# Patient Record
Sex: FEMALE | Race: WHITE | NOT HISPANIC OR LATINO | Employment: FULL TIME | URBAN - METROPOLITAN AREA
[De-identification: names, ages, dates, MRNs, and addresses within clinical notes are randomized per-mention and may not be internally consistent; named-entity substitution may affect disease eponyms.]

---

## 2019-12-24 ENCOUNTER — OFFICE VISIT (OUTPATIENT)
Dept: URGENT CARE | Facility: CLINIC | Age: 54
End: 2019-12-24
Payer: COMMERCIAL

## 2019-12-24 VITALS
SYSTOLIC BLOOD PRESSURE: 110 MMHG | OXYGEN SATURATION: 98 % | WEIGHT: 157.4 LBS | TEMPERATURE: 98.6 F | DIASTOLIC BLOOD PRESSURE: 68 MMHG | BODY MASS INDEX: 26.23 KG/M2 | HEART RATE: 72 BPM | HEIGHT: 65 IN | RESPIRATION RATE: 18 BRPM

## 2019-12-24 DIAGNOSIS — B34.9 VIRAL SYNDROME: Primary | ICD-10-CM

## 2019-12-24 LAB — S PYO AG THROAT QL: NEGATIVE

## 2019-12-24 PROCEDURE — 87880 STREP A ASSAY W/OPTIC: CPT | Performed by: PHYSICIAN ASSISTANT

## 2019-12-24 PROCEDURE — 99213 OFFICE O/P EST LOW 20 MIN: CPT | Performed by: PHYSICIAN ASSISTANT

## 2019-12-24 PROCEDURE — 87631 RESP VIRUS 3-5 TARGETS: CPT | Performed by: PHYSICIAN ASSISTANT

## 2019-12-24 RX ORDER — FLUOXETINE 10 MG/1
10 CAPSULE ORAL DAILY
COMMUNITY
End: 2020-10-07 | Stop reason: SDUPTHER

## 2019-12-24 RX ORDER — OMEPRAZOLE 20 MG/1
20 CAPSULE, DELAYED RELEASE ORAL DAILY
COMMUNITY
End: 2021-12-01

## 2019-12-24 NOTE — PROGRESS NOTES
[facility] [employer] [facility]        NAME: Julio Corral is a 48 y o  female  : 1965    MRN: [id number]  DATE: 2019  TIME: 4:34 PM    Assessment and Plan   Viral syndrome [B34 9]  1  Viral syndrome  Influenza A/B and RSV by PCR    POCT rapid strepA       Patient Instructions     Begin Mucinex for nasal congestion  Nasal saline spray or Neti pot to rinse the nasal passages  Tylenol or Advil may be taken for fever and discomfort  Check your package labeling as some cold medications already contain fever reducers  Saltwater gargles, warm tea with honey, and throat lozenges may be relieving of throat discomfort  Get plenty of rest and lots of water  Use a cool mist humidifier at bedtime, turning on hours prior to bed with your bedroom doors shut for maximum relief  Follow up with your family doctor in 3-5 days  Proceed to the ER if symptoms worsen  Discussed Flu vs alternative virus, at this time believed to be another virus given no fever or tachycardia  Pt requesting flu swab to be safe  Declined Tamiflu  The dx, expected course of sx, and tx plan reviewed  All questions answered  Precautions given  Chief Complaint     Chief Complaint   Patient presents with    Cold Like Symptoms     Started yesterday with HA, body aches, sl  sore throat, occ  dry cough, nasal congestion with PND, occ  chills and b/l ear pain/pressure  Took Tylenol last night   Earache    Cough         History of Present Illness       49 y/o female with c/o body aches x 1 day  She reports fatigue, body aches, HA, F/C/S, NC, RN, b/l ear pain, and sore throat  Reports a cough and nausea in response to PND  No treatments tried  Sick coworkers  No recent travel  No flu shot  Review of Systems   Review of Systems   Constitutional: Positive for chills, diaphoresis, fatigue and fever  HENT: Positive for congestion, ear pain, postnasal drip and sore throat  Negative for rhinorrhea  Respiratory: Positive for cough  What Type Of Note Output Would You Prefer (Optional)?: Standard Output Hpi Title: Evaluation of Skin Lesions Negative for wheezing  Gastrointestinal: Positive for nausea  Negative for abdominal pain, diarrhea and vomiting  Musculoskeletal: Positive for myalgias  Skin: Negative for rash  Neurological: Positive for headaches  Current Medications       Current Outpatient Medications:     FLUoxetine (PROzac) 10 mg capsule, Take 10 mg by mouth daily, Disp: , Rfl:     omeprazole (PriLOSEC) 20 mg delayed release capsule, Take 20 mg by mouth daily, Disp: , Rfl:     Current Allergies     Allergies as of 12/24/2019 - Reviewed 12/24/2019   Allergen Reaction Noted    Penicillins Facial Swelling 12/24/2019          The following portions of the patient's history were reviewed and updated as appropriate: allergies, current medications, past family history, past medical history, past social history, past surgical history and problem list      Past Medical History:   Diagnosis Date    Anxiety     Depression     GERD (gastroesophageal reflux disease)      Past Surgical History:   Procedure Laterality Date    CERVICAL FUSION      HERNIA REPAIR Left     inquinal    OVARY SURGERY Right     oopherectomy        History reviewed  No pertinent family history  Medications have been verified  Objective   /68   Pulse 72   Temp 98 6 °F (37 °C)   Resp 18   Ht 5' 5" (1 651 m)   Wt 71 4 kg (157 lb 6 4 oz)   SpO2 98%   BMI 26 19 kg/m²        Physical Exam     Physical Exam   Constitutional: Vital signs are normal  She appears well-developed and well-nourished  She is cooperative  She does not appear ill  No distress  HENT:   Head: Normocephalic and atraumatic  Right Ear: Hearing, tympanic membrane, external ear and ear canal normal    Left Ear: Hearing, tympanic membrane, external ear and ear canal normal    Nose: Nose normal    Mouth/Throat: Uvula is midline, oropharynx is clear and moist and mucous membranes are normal  Mucous membranes are not pale, not dry and not cyanotic  No oral lesions   No uvula How Severe Are Your Spot(S)?: moderate swelling  No oropharyngeal exudate, posterior oropharyngeal edema, posterior oropharyngeal erythema or tonsillar abscesses  Tonsils are 1+ on the right  Tonsils are 1+ on the left  No tonsillar exudate  Eyes: Conjunctivae and lids are normal  Right eye exhibits no discharge and no exudate  Left eye exhibits no discharge and no exudate  Neck: Trachea normal and phonation normal  Neck supple  No tracheal tenderness present  No neck rigidity  No edema and no erythema present  Cardiovascular: Normal rate, regular rhythm and normal heart sounds  Exam reveals no distant heart sounds  Pulmonary/Chest: Effort normal and breath sounds normal  No stridor  No respiratory distress  She has no decreased breath sounds  She has no wheezes  She has no rhonchi  She has no rales  Lymphadenopathy:     She has no cervical adenopathy  Neurological: She is alert  She is not disoriented  No cranial nerve deficit  Coordination and gait normal    Skin: Skin is warm, dry and intact  No rash noted  She is not diaphoretic  No erythema  No pallor  Psychiatric: She has a normal mood and affect  Her behavior is normal  Judgment and thought content normal    Nursing note and vitals reviewed  Have Your Spot(S) Been Treated In The Past?: has not been treated Additional History: PCP Dr Dennis Mojica

## 2019-12-24 NOTE — PATIENT INSTRUCTIONS
Begin Mucinex for nasal congestion  Nasal saline spray or Neti pot to rinse the nasal passages  Tylenol or Advil may be taken for fever and discomfort  Check your package labeling as some cold medications already contain fever reducers  Saltwater gargles, warm tea with honey, and throat lozenges may be relieving of throat discomfort  Get plenty of rest and lots of water  Use a cool mist humidifier at bedtime, turning on hours prior to bed with your bedroom doors shut for maximum relief  Follow up with your family doctor in 3-5 days  Proceed to the ER if symptoms worsen

## 2019-12-25 LAB
FLUAV RNA NPH QL NAA+PROBE: NORMAL
FLUBV RNA NPH QL NAA+PROBE: NORMAL
RSV RNA NPH QL NAA+PROBE: NORMAL

## 2020-03-13 ENCOUNTER — TELEPHONE (OUTPATIENT)
Dept: FAMILY MEDICINE CLINIC | Facility: CLINIC | Age: 55
End: 2020-03-13

## 2020-10-07 ENCOUNTER — OFFICE VISIT (OUTPATIENT)
Dept: FAMILY MEDICINE CLINIC | Facility: CLINIC | Age: 55
End: 2020-10-07
Payer: COMMERCIAL

## 2020-10-07 VITALS
DIASTOLIC BLOOD PRESSURE: 80 MMHG | WEIGHT: 162 LBS | SYSTOLIC BLOOD PRESSURE: 122 MMHG | BODY MASS INDEX: 26.99 KG/M2 | HEIGHT: 65 IN | OXYGEN SATURATION: 98 % | RESPIRATION RATE: 18 BRPM | HEART RATE: 68 BPM | TEMPERATURE: 96.8 F

## 2020-10-07 DIAGNOSIS — Z13.6 SCREENING FOR CARDIOVASCULAR CONDITION: ICD-10-CM

## 2020-10-07 DIAGNOSIS — T78.2XXA ANAPHYLAXIS, INITIAL ENCOUNTER: ICD-10-CM

## 2020-10-07 DIAGNOSIS — F41.9 ANXIETY: ICD-10-CM

## 2020-10-07 DIAGNOSIS — Z00.00 WELL ADULT EXAM: Primary | ICD-10-CM

## 2020-10-07 DIAGNOSIS — R73.09 ABNORMAL GLUCOSE: ICD-10-CM

## 2020-10-07 DIAGNOSIS — K21.9 GASTROESOPHAGEAL REFLUX DISEASE WITHOUT ESOPHAGITIS: ICD-10-CM

## 2020-10-07 DIAGNOSIS — Z11.4 SCREENING FOR HIV (HUMAN IMMUNODEFICIENCY VIRUS): ICD-10-CM

## 2020-10-07 DIAGNOSIS — E55.9 VITAMIN D DEFICIENCY: ICD-10-CM

## 2020-10-07 DIAGNOSIS — N30.10 INTERSTITIAL CYSTITIS: ICD-10-CM

## 2020-10-07 DIAGNOSIS — Z12.31 SCREENING MAMMOGRAM FOR HIGH-RISK PATIENT: ICD-10-CM

## 2020-10-07 PROBLEM — R91.1 SOLITARY PULMONARY NODULE: Status: ACTIVE | Noted: 2019-02-08

## 2020-10-07 PROBLEM — Z72.0 TOBACCO ABUSE DISORDER: Status: ACTIVE | Noted: 2020-10-07

## 2020-10-07 PROBLEM — R56.9 SEIZURE (HCC): Status: ACTIVE | Noted: 2019-02-08

## 2020-10-07 PROBLEM — G43.909 MIGRAINE: Status: ACTIVE | Noted: 2019-02-08

## 2020-10-07 PROCEDURE — 99386 PREV VISIT NEW AGE 40-64: CPT | Performed by: FAMILY MEDICINE

## 2020-10-07 PROCEDURE — 3725F SCREEN DEPRESSION PERFORMED: CPT | Performed by: FAMILY MEDICINE

## 2020-10-07 RX ORDER — AZELASTINE 1 MG/ML
1 SPRAY, METERED NASAL DAILY
COMMUNITY
End: 2021-12-01

## 2020-10-07 RX ORDER — EPINEPHRINE 0.3 MG/.3ML
0.3 INJECTION SUBCUTANEOUS ONCE
Qty: 1 EACH | Refills: 1
Start: 2020-10-07 | End: 2021-06-03 | Stop reason: SDUPTHER

## 2020-10-07 RX ORDER — FLUOXETINE 10 MG/1
10 CAPSULE ORAL DAILY
Qty: 90 CAPSULE | Refills: 1 | Status: SHIPPED | OUTPATIENT
Start: 2020-10-07 | End: 2021-06-19

## 2020-10-08 ENCOUNTER — TELEPHONE (OUTPATIENT)
Dept: FAMILY MEDICINE CLINIC | Facility: CLINIC | Age: 55
End: 2020-10-08

## 2020-10-08 ENCOUNTER — TELEPHONE (OUTPATIENT)
Dept: ADMINISTRATIVE | Facility: OTHER | Age: 55
End: 2020-10-08

## 2020-10-08 DIAGNOSIS — Z20.822 EXPOSURE TO COVID-19 VIRUS: Primary | ICD-10-CM

## 2020-10-28 ENCOUNTER — TELEPHONE (OUTPATIENT)
Dept: FAMILY MEDICINE CLINIC | Facility: CLINIC | Age: 55
End: 2020-10-28

## 2020-11-04 ENCOUNTER — TELEPHONE (OUTPATIENT)
Dept: FAMILY MEDICINE CLINIC | Facility: CLINIC | Age: 55
End: 2020-11-04

## 2020-11-21 LAB
25(OH)D3+25(OH)D2 SERPL-MCNC: 20.1 NG/ML (ref 30–100)
ALBUMIN SERPL-MCNC: 4.6 G/DL (ref 3.8–4.9)
ALBUMIN/GLOB SERPL: 1.9 {RATIO} (ref 1.2–2.2)
ALP SERPL-CCNC: 63 IU/L (ref 39–117)
ALT SERPL-CCNC: 16 IU/L (ref 0–32)
AST SERPL-CCNC: 21 IU/L (ref 0–40)
BILIRUB SERPL-MCNC: 0.3 MG/DL (ref 0–1.2)
BUN SERPL-MCNC: 6 MG/DL (ref 6–24)
BUN/CREAT SERPL: 7 (ref 9–23)
CALCIUM SERPL-MCNC: 10 MG/DL (ref 8.7–10.2)
CHLORIDE SERPL-SCNC: 100 MMOL/L (ref 96–106)
CHOLEST SERPL-MCNC: 245 MG/DL (ref 100–199)
CO2 SERPL-SCNC: 24 MMOL/L (ref 20–29)
CREAT SERPL-MCNC: 0.89 MG/DL (ref 0.57–1)
GLOBULIN SER-MCNC: 2.4 G/DL (ref 1.5–4.5)
GLUCOSE SERPL-MCNC: 106 MG/DL (ref 65–99)
HBA1C MFR BLD: 5.5 % (ref 4.8–5.6)
HDLC SERPL-MCNC: 58 MG/DL
HIV 1+2 AB+HIV1 P24 AG SERPL QL IA: NON REACTIVE
LDLC SERPL CALC-MCNC: 179 MG/DL (ref 0–99)
MICRODELETION SYND BLD/T FISH: NORMAL
POTASSIUM SERPL-SCNC: 5.1 MMOL/L (ref 3.5–5.2)
PROT SERPL-MCNC: 7 G/DL (ref 6–8.5)
SARS-COV-2 IGG SERPL QL IA: NEGATIVE
SARS-COV-2 IGG SERPL QL IA: NORMAL
SARS-COV-2 IGM SERPL QL IA: NEGATIVE
SL AMB EGFR AFRICAN AMERICAN: 86 ML/MIN/1.73
SL AMB EGFR NON AFRICAN AMERICAN: 74 ML/MIN/1.73
SODIUM SERPL-SCNC: 136 MMOL/L (ref 134–144)
TRIGL SERPL-MCNC: 51 MG/DL (ref 0–149)

## 2020-11-23 ENCOUNTER — TELEMEDICINE (OUTPATIENT)
Dept: FAMILY MEDICINE CLINIC | Facility: CLINIC | Age: 55
End: 2020-11-23
Payer: COMMERCIAL

## 2020-11-23 ENCOUNTER — TELEPHONE (OUTPATIENT)
Dept: FAMILY MEDICINE CLINIC | Facility: CLINIC | Age: 55
End: 2020-11-23

## 2020-11-23 DIAGNOSIS — Z20.822 EXPOSURE TO COVID-19 VIRUS: ICD-10-CM

## 2020-11-23 DIAGNOSIS — J02.9 PHARYNGITIS, UNSPECIFIED ETIOLOGY: Primary | ICD-10-CM

## 2020-11-23 DIAGNOSIS — Z20.822 EXPOSURE TO COVID-19 VIRUS: Primary | ICD-10-CM

## 2020-11-23 PROCEDURE — 99442 PR PHYS/QHP TELEPHONE EVALUATION 11-20 MIN: CPT | Performed by: FAMILY MEDICINE

## 2020-11-23 PROCEDURE — U0003 INFECTIOUS AGENT DETECTION BY NUCLEIC ACID (DNA OR RNA); SEVERE ACUTE RESPIRATORY SYNDROME CORONAVIRUS 2 (SARS-COV-2) (CORONAVIRUS DISEASE [COVID-19]), AMPLIFIED PROBE TECHNIQUE, MAKING USE OF HIGH THROUGHPUT TECHNOLOGIES AS DESCRIBED BY CMS-2020-01-R: HCPCS | Performed by: FAMILY MEDICINE

## 2020-11-23 RX ORDER — AZITHROMYCIN 250 MG/1
TABLET, FILM COATED ORAL
Qty: 6 TABLET | Refills: 0 | Status: SHIPPED | OUTPATIENT
Start: 2020-11-23 | End: 2020-11-28

## 2020-11-24 ENCOUNTER — TELEPHONE (OUTPATIENT)
Dept: FAMILY MEDICINE CLINIC | Facility: CLINIC | Age: 55
End: 2020-11-24

## 2020-11-24 LAB — SARS-COV-2 RNA SPEC QL NAA+PROBE: NOT DETECTED

## 2020-11-25 ENCOUNTER — TELEPHONE (OUTPATIENT)
Dept: FAMILY MEDICINE CLINIC | Facility: CLINIC | Age: 55
End: 2020-11-25

## 2020-11-25 ENCOUNTER — TELEMEDICINE (OUTPATIENT)
Dept: FAMILY MEDICINE CLINIC | Facility: CLINIC | Age: 55
End: 2020-11-25
Payer: COMMERCIAL

## 2020-11-25 VITALS — TEMPERATURE: 98.2 F | HEIGHT: 65 IN | WEIGHT: 162 LBS | BODY MASS INDEX: 26.99 KG/M2

## 2020-11-25 DIAGNOSIS — E55.9 VITAMIN D DEFICIENCY: Primary | ICD-10-CM

## 2020-11-25 DIAGNOSIS — E78.2 MIXED HYPERLIPIDEMIA: ICD-10-CM

## 2020-11-25 PROCEDURE — 3008F BODY MASS INDEX DOCD: CPT | Performed by: FAMILY MEDICINE

## 2020-11-25 PROCEDURE — 99213 OFFICE O/P EST LOW 20 MIN: CPT | Performed by: FAMILY MEDICINE

## 2020-11-25 RX ORDER — ROSUVASTATIN CALCIUM 10 MG/1
10 TABLET, COATED ORAL DAILY
Qty: 90 TABLET | Refills: 1 | Status: SHIPPED | OUTPATIENT
Start: 2020-11-25 | End: 2021-08-14

## 2020-11-25 RX ORDER — ERGOCALCIFEROL (VITAMIN D2) 1250 MCG
50000 CAPSULE ORAL WEEKLY
Qty: 8 CAPSULE | Refills: 0 | Status: SHIPPED | OUTPATIENT
Start: 2020-11-25 | End: 2021-03-16

## 2021-03-16 DIAGNOSIS — E55.9 VITAMIN D DEFICIENCY: ICD-10-CM

## 2021-03-16 RX ORDER — ERGOCALCIFEROL 1.25 MG/1
CAPSULE ORAL
Qty: 8 CAPSULE | Refills: 0 | Status: SHIPPED | OUTPATIENT
Start: 2021-03-16 | End: 2021-12-01

## 2021-04-05 ENCOUNTER — TELEPHONE (OUTPATIENT)
Dept: FAMILY MEDICINE CLINIC | Facility: CLINIC | Age: 56
End: 2021-04-05

## 2021-04-05 DIAGNOSIS — Z12.31 SCREENING MAMMOGRAM FOR HIGH-RISK PATIENT: ICD-10-CM

## 2021-06-03 ENCOUNTER — OFFICE VISIT (OUTPATIENT)
Dept: FAMILY MEDICINE CLINIC | Facility: CLINIC | Age: 56
End: 2021-06-03
Payer: COMMERCIAL

## 2021-06-03 VITALS
BODY MASS INDEX: 26.66 KG/M2 | WEIGHT: 160 LBS | OXYGEN SATURATION: 97 % | TEMPERATURE: 98 F | HEIGHT: 65 IN | RESPIRATION RATE: 16 BRPM | HEART RATE: 72 BPM | SYSTOLIC BLOOD PRESSURE: 124 MMHG | DIASTOLIC BLOOD PRESSURE: 76 MMHG

## 2021-06-03 DIAGNOSIS — R56.9 SEIZURE (HCC): ICD-10-CM

## 2021-06-03 DIAGNOSIS — M79.662 PAIN OF LEFT CALF: Primary | ICD-10-CM

## 2021-06-03 DIAGNOSIS — T78.2XXA ANAPHYLAXIS, INITIAL ENCOUNTER: ICD-10-CM

## 2021-06-03 DIAGNOSIS — S89.92XA TRAUMATIC LEG INJURY, LEFT, INITIAL ENCOUNTER: ICD-10-CM

## 2021-06-03 PROCEDURE — 3725F SCREEN DEPRESSION PERFORMED: CPT | Performed by: FAMILY MEDICINE

## 2021-06-03 PROCEDURE — 99213 OFFICE O/P EST LOW 20 MIN: CPT | Performed by: FAMILY MEDICINE

## 2021-06-03 PROCEDURE — 3008F BODY MASS INDEX DOCD: CPT | Performed by: FAMILY MEDICINE

## 2021-06-03 RX ORDER — AZELASTINE HYDROCHLORIDE, FLUTICASONE PROPIONATE 137; 50 UG/1; UG/1
SPRAY, METERED NASAL
COMMUNITY
Start: 2021-03-16 | End: 2021-12-01

## 2021-06-03 RX ORDER — EPINEPHRINE 0.3 MG/.3ML
0.3 INJECTION SUBCUTANEOUS ONCE
Qty: 1 EACH | Refills: 1
Start: 2021-06-03 | End: 2021-06-21 | Stop reason: SDUPTHER

## 2021-06-03 NOTE — PROGRESS NOTES
Assessment/Plan:    1  Pain of left calf  -     Magnesium; Future  -     Phosphorus; Future  -     Comprehensive metabolic panel; Future; Expected date: 08/17/2021  -     CK (with reflex to MB); Future  -     MRI tibia fibula left wo contrast; Future; Expected date: 06/03/2021    2  Traumatic leg injury, left, initial encounter  -     Magnesium; Future  -     Phosphorus; Future  -     Comprehensive metabolic panel; Future; Expected date: 08/17/2021  -     CK (with reflex to MB); Future  -     MRI tibia fibula left wo contrast; Future; Expected date: 06/03/2021    3  Seizure ([address] )    4  Anaphylaxis, initial encounter  -     EPINEPHrine (EPIPEN) 0 3 mg/0 3 mL SOAJ; Inject 0 3 mL (0 3 mg total) into a muscle once for 1 dose As needed for anaphylaxis, proceed to ER    Pt seems to have a tear at gastroch on left leg - will follow MRI  Pt advised of my feelings will follow        There are no Patient Instructions on file for this visit  Return for Next scheduled follow up  Subjective:      Patient ID: Chuck Vasquez is a 49 y o  female  Chief Complaint   Patient presents with    Pulled muscle     Left calf  mz cma       Pt is here today with a pull in her left calf  Pt states two months ago she was going down the stairs felt something rip, felt a tear  In her left calf  PT states it feels very deep  States she will now get the ripping feeling when he walks or takes a step  The following portions of the patient's history were reviewed and updated as appropriate: allergies, current medications, past family history, past medical history, past social history, past surgical history and problem list     Review of Systems   Constitutional: Negative  Negative for activity change, appetite change, chills, diaphoresis and fatigue  HENT: Negative  Negative for dental problem, ear pain, sinus pressure and sore throat  Eyes: Negative    Negative for photophobia, pain, discharge, redness, itching and visual disturbance  Respiratory: Negative for apnea and chest tightness  Cardiovascular: Negative  Negative for chest pain, palpitations and leg swelling  Gastrointestinal: Negative  Negative for abdominal distention, abdominal pain, constipation and diarrhea  Endocrine: Negative  Negative for cold intolerance and heat intolerance  Genitourinary: Negative  Negative for difficulty urinating and dyspareunia  Musculoskeletal: Positive for gait problem and myalgias  Negative for arthralgias and back pain  Skin: Negative  Allergic/Immunologic: Negative for environmental allergies  Neurological: Negative for dizziness  Psychiatric/Behavioral: Negative  Negative for agitation  Current Outpatient Medications   Medication Sig Dispense Refill    azelastine (ASTELIN) 0 1 % nasal spray 1 spray into each nostril daily Use in each nostril as directed      Azelastine-Fluticasone 137-50 MCG/ACT SUSP       EPINEPHrine (EPIPEN) 0 3 mg/0 3 mL SOAJ Inject 0 3 mL (0 3 mg total) into a muscle once for 1 dose As needed for anaphylaxis, proceed to ER 1 each 1    FLUoxetine (PROzac) 10 mg capsule Take 1 capsule (10 mg total) by mouth daily 90 capsule 1    omeprazole (PriLOSEC) 20 mg delayed release capsule Take 20 mg by mouth daily      rosuvastatin (CRESTOR) 10 MG tablet Take 1 tablet (10 mg total) by mouth daily 90 tablet 1    ergocalciferol (VITAMIN D2) 50,000 units TAKE ONE CAPSULE BY MOUTH ONCE EVERY WEEK FOR 8 DOSES (Patient not taking: Reported on 6/3/2021) 8 capsule 0     No current facility-administered medications for this visit  Objective:    /76   Pulse 72   Temp 98 °F (36 7 °C)   Resp 16   Ht 5' 5" (1 651 m)   Wt 72 6 kg (160 lb)   SpO2 97%   BMI 26 63 kg/m²        Physical Exam  Vitals signs and nursing note reviewed  Constitutional:       General: She is not in acute distress  Appearance: She is well-developed  She is not diaphoretic     HENT:      Head: Normocephalic and atraumatic  Right Ear: External ear normal       Left Ear: External ear normal       Nose: Nose normal       Mouth/Throat:      Pharynx: No oropharyngeal exudate  Eyes:      General: No scleral icterus  Right eye: No discharge  Left eye: No discharge  Pupils: Pupils are equal, round, and reactive to light  Neck:      Thyroid: No thyromegaly  Cardiovascular:      Rate and Rhythm: Normal rate  Heart sounds: Normal heart sounds  No murmur  Pulmonary:      Effort: Pulmonary effort is normal  No respiratory distress  Breath sounds: Normal breath sounds  No wheezing  Abdominal:      General: Bowel sounds are normal  There is no distension  Palpations: Abdomen is soft  There is no mass  Tenderness: There is no abdominal tenderness  There is no guarding or rebound  Musculoskeletal: Normal range of motion  Comments: Tenderness in left lat gastroch at distal end  Swelling present  Pain with stepping  Pain with plantar flexion of foot   Skin:     General: Skin is warm and dry  Findings: No erythema or rash  Neurological:      Mental Status: She is alert        Coordination: Coordination normal       Deep Tendon Reflexes: Reflexes normal    Psychiatric:         Behavior: Behavior normal                 Rafia Null DO

## 2021-06-13 LAB
ALBUMIN SERPL-MCNC: 4.7 G/DL (ref 3.8–4.9)
ALBUMIN/GLOB SERPL: 2.5 {RATIO} (ref 1.2–2.2)
ALP SERPL-CCNC: 65 IU/L (ref 48–121)
ALT SERPL-CCNC: 15 IU/L (ref 0–32)
AST SERPL-CCNC: 18 IU/L (ref 0–40)
BILIRUB SERPL-MCNC: 0.2 MG/DL (ref 0–1.2)
BUN SERPL-MCNC: 11 MG/DL (ref 6–24)
BUN/CREAT SERPL: 13 (ref 9–23)
CALCIUM SERPL-MCNC: 9.8 MG/DL (ref 8.7–10.2)
CHLORIDE SERPL-SCNC: 100 MMOL/L (ref 96–106)
CK SERPL-CCNC: 59 U/L (ref 32–182)
CO2 SERPL-SCNC: 25 MMOL/L (ref 20–29)
CREAT SERPL-MCNC: 0.87 MG/DL (ref 0.57–1)
GLOBULIN SER-MCNC: 1.9 G/DL (ref 1.5–4.5)
GLUCOSE SERPL-MCNC: 103 MG/DL (ref 65–99)
MAGNESIUM SERPL-MCNC: 2.1 MG/DL (ref 1.6–2.3)
PHOSPHATE SERPL-MCNC: 4.9 MG/DL (ref 3–4.3)
POTASSIUM SERPL-SCNC: 4.7 MMOL/L (ref 3.5–5.2)
PROT SERPL-MCNC: 6.6 G/DL (ref 6–8.5)
SL AMB EGFR AFRICAN AMERICAN: 87 ML/MIN/1.73
SL AMB EGFR NON AFRICAN AMERICAN: 76 ML/MIN/1.73
SODIUM SERPL-SCNC: 137 MMOL/L (ref 134–144)

## 2021-06-19 DIAGNOSIS — F41.9 ANXIETY: ICD-10-CM

## 2021-06-19 RX ORDER — FLUOXETINE 10 MG/1
CAPSULE ORAL
Qty: 90 CAPSULE | Refills: 1 | Status: SHIPPED | OUTPATIENT
Start: 2021-06-19 | End: 2022-04-18

## 2021-06-21 DIAGNOSIS — T78.2XXA ANAPHYLAXIS, INITIAL ENCOUNTER: ICD-10-CM

## 2021-06-21 RX ORDER — EPINEPHRINE 0.3 MG/.3ML
0.3 INJECTION SUBCUTANEOUS ONCE
Qty: 1 EACH | Refills: 1
Start: 2021-06-21 | End: 2021-07-13 | Stop reason: SDUPTHER

## 2021-07-13 DIAGNOSIS — T78.2XXA ANAPHYLAXIS, INITIAL ENCOUNTER: ICD-10-CM

## 2021-07-13 RX ORDER — EPINEPHRINE 0.3 MG/.3ML
0.3 INJECTION SUBCUTANEOUS ONCE
Qty: 1 EACH | Refills: 1
Start: 2021-07-13 | End: 2021-07-21 | Stop reason: SDUPTHER

## 2021-07-21 DIAGNOSIS — T78.2XXA ANAPHYLAXIS, INITIAL ENCOUNTER: ICD-10-CM

## 2021-07-21 RX ORDER — EPINEPHRINE 0.3 MG/.3ML
0.3 INJECTION SUBCUTANEOUS ONCE
Qty: 1 EACH | Refills: 1 | Status: SHIPPED | OUTPATIENT
Start: 2021-07-21 | End: 2021-07-23 | Stop reason: SDUPTHER

## 2021-08-04 ENCOUNTER — OFFICE VISIT (OUTPATIENT)
Dept: URGENT CARE | Facility: CLINIC | Age: 56
End: 2021-08-04
Payer: COMMERCIAL

## 2021-08-04 VITALS
DIASTOLIC BLOOD PRESSURE: 70 MMHG | TEMPERATURE: 100.2 F | HEIGHT: 65 IN | HEART RATE: 91 BPM | SYSTOLIC BLOOD PRESSURE: 117 MMHG | BODY MASS INDEX: 26.66 KG/M2 | RESPIRATION RATE: 18 BRPM | OXYGEN SATURATION: 97 % | WEIGHT: 160 LBS

## 2021-08-04 DIAGNOSIS — Z11.59 SPECIAL SCREENING EXAMINATION FOR UNSPECIFIED VIRAL DISEASE: Primary | ICD-10-CM

## 2021-08-04 PROCEDURE — 99213 OFFICE O/P EST LOW 20 MIN: CPT | Performed by: FAMILY MEDICINE

## 2021-08-04 PROCEDURE — U0003 INFECTIOUS AGENT DETECTION BY NUCLEIC ACID (DNA OR RNA); SEVERE ACUTE RESPIRATORY SYNDROME CORONAVIRUS 2 (SARS-COV-2) (CORONAVIRUS DISEASE [COVID-19]), AMPLIFIED PROBE TECHNIQUE, MAKING USE OF HIGH THROUGHPUT TECHNOLOGIES AS DESCRIBED BY CMS-2020-01-R: HCPCS | Performed by: FAMILY MEDICINE

## 2021-08-04 PROCEDURE — U0005 INFEC AGEN DETEC AMPLI PROBE: HCPCS | Performed by: FAMILY MEDICINE

## 2021-08-04 PROCEDURE — 3008F BODY MASS INDEX DOCD: CPT | Performed by: FAMILY MEDICINE

## 2021-08-04 NOTE — PROGRESS NOTES
[facility] [email or web address] [facility]        NAME: Umu Reddy is a 49 y o  female  : 1965    MRN: [id number]  DATE: 2021  TIME: 9:14 AM    Assessment and Plan   Special screening examination for unspecified viral disease [Z11 59]  1  Special screening examination for unspecified viral disease  Novel Coronavirus (Covid-19),PCR [facility]     Tested for COVID-19 and instructed to self quarantine until her results are received  Continue with supportive management including remaining well hydrated with water, taking Tylenol for pain and fevers and taking a multivitamin (with vitamin-C and D)  Patient Instructions     Follow up with PCP in 3-5 days  Proceed to  ER if symptoms worsen  Chief Complaint     Chief Complaint   Patient presents with    Fever     Patient stated that Monday she started with chills and head congestion  Her fever was running 102 to 103 tylenol would bring the fever down but as soon it wears of her fever comes back  She has traveled to [facility] when the symptoms started    Nasal Congestion         History of Present Illness     53-year-old female presents today with about 3 days of symptoms concerning for possible COVID-19  Has been experiencing fatigue, high fevers up to 103°, chills, headaches and generalized myalgias  Also reports no onset postnasal drip with some sinus pressure  Does not recall any obvious sick contacts  Did travel out of state for about 2 days and returned yesterday  Review of Systems   Review of Systems   Constitutional: Positive for chills, fatigue and fever (103)  HENT: Positive for postnasal drip and sinus pressure  Respiratory: Negative for cough  Musculoskeletal: Positive for myalgias  Neurological: Positive for headaches  Psychiatric/Behavioral: Positive for confusion       Current Medications       Current Outpatient Medications:     azelastine (ASTELIN) 0 1 % nasal spray, 1 spray into each nostril daily Use in each nostril as directed, Disp: , Rfl:     Azelastine-Fluticasone 137-50 MCG/ACT SUSP, , Disp: , Rfl:     FLUoxetine (PROzac) 10 mg capsule, TAKE ONE CAPSULE BY MOUTH EVERY DAY, Disp: 90 capsule, Rfl: 1    omeprazole (PriLOSEC) 20 mg delayed release capsule, Take 20 mg by mouth daily, Disp: , Rfl:     rosuvastatin (CRESTOR) 10 MG tablet, Take 1 tablet (10 mg total) by mouth daily, Disp: 90 tablet, Rfl: 1    EPINEPHrine (EPIPEN) 0 3 mg/0 3 mL SOAJ, Inject 0 3 mL (0 3 mg total) into a muscle once for 1 dose As needed for anaphylaxis, proceed to ER, Disp: 1 each, Rfl: 1    ergocalciferol (VITAMIN D2) 50,000 units, TAKE ONE CAPSULE BY MOUTH ONCE EVERY WEEK FOR 8 DOSES (Patient not taking: Reported on 6/3/2021), Disp: 8 capsule, Rfl: 0    Current Allergies     Allergies as of 08/04/2021 - Reviewed 08/04/2021   Allergen Reaction Noted    Penicillins Facial Swelling 12/24/2019    Aspirin Other (See Comments) 12/24/2019    Latex Rash 10/07/2020    Other Rash 10/07/2020            The following portions of the patient's history were reviewed and updated as appropriate: allergies, current medications, past family history, past medical history, past social history, past surgical history and problem list      Past Medical History:   Diagnosis Date    Anxiety     Chronic sinusitis     Depression     GERD (gastroesophageal reflux disease)        Past Surgical History:   Procedure Laterality Date    CERVICAL FUSION      HERNIA REPAIR Left     inquinal    OVARY SURGERY Right     oopherectomy     TOE SURGERY         Family History   Problem Relation Age of Onset    COPD Mother     No Known Problems Father     Breast cancer Maternal Grandmother     Breast cancer Cousin     Mental illness Neg Hx          Medications have been verified  Objective   /70   Pulse 91   Temp 100 2 °F (37 9 °C)   Resp 18   Ht 5' 5" (1 651 m)   Wt 72 6 kg (160 lb)   SpO2 97%   BMI 26 63 kg/m²   No LMP recorded   Patient is postmenopausal        Physical Exam     Physical Exam  Vitals and nursing note reviewed  Constitutional:       General: She is in acute distress  Appearance: Normal appearance  She is normal weight  She is not ill-appearing, toxic-appearing or diaphoretic  HENT:      Head: Normocephalic and atraumatic  Eyes:      General:         Right eye: No discharge  Left eye: No discharge  Conjunctiva/sclera: Conjunctivae normal    Pulmonary:      Effort: Pulmonary effort is normal    Skin:     General: Skin is warm  Findings: No erythema  Neurological:      General: No focal deficit present  Mental Status: She is alert and oriented to person, place, and time  Psychiatric:         Mood and Affect: Mood normal          Behavior: Behavior normal          Thought Content:  Thought content normal          Judgment: Judgment normal

## 2021-08-05 ENCOUNTER — TELEPHONE (OUTPATIENT)
Dept: URGENT CARE | Facility: CLINIC | Age: 56
End: 2021-08-05

## 2021-08-05 DIAGNOSIS — R50.81 FEVER IN OTHER DISEASES: ICD-10-CM

## 2021-08-05 DIAGNOSIS — B96.89 BACTERIAL URI: ICD-10-CM

## 2021-08-05 DIAGNOSIS — J06.9 BACTERIAL URI: ICD-10-CM

## 2021-08-05 LAB — SARS-COV-2 RNA RESP QL NAA+PROBE: NEGATIVE

## 2021-08-05 RX ORDER — AZITHROMYCIN 250 MG/1
TABLET, FILM COATED ORAL
Qty: 6 TABLET | Refills: 0 | Status: SHIPPED | OUTPATIENT
Start: 2021-08-05 | End: 2021-08-09

## 2021-08-05 NOTE — TELEPHONE ENCOUNTER
PT CALLED  COVID TEST NEGATIVE  SHE WOULD LIKE AN ANTIBIOTIC OR DECONGESTANT OR SOMETHING SENT TO PHARMACY   BUT WANTS TO SPEAK TO YOU FIRST

## 2021-08-14 DIAGNOSIS — E78.2 MIXED HYPERLIPIDEMIA: ICD-10-CM

## 2021-08-14 RX ORDER — ROSUVASTATIN CALCIUM 10 MG/1
TABLET, COATED ORAL
Qty: 90 TABLET | Refills: 1 | Status: SHIPPED | OUTPATIENT
Start: 2021-08-14 | End: 2021-12-01

## 2021-10-06 ENCOUNTER — TELEPHONE (OUTPATIENT)
Dept: FAMILY MEDICINE CLINIC | Facility: CLINIC | Age: 56
End: 2021-10-06

## 2021-10-22 ENCOUNTER — HOSPITAL ENCOUNTER (EMERGENCY)
Facility: HOSPITAL | Age: 56
Discharge: HOME | End: 2021-10-22
Attending: EMERGENCY MEDICINE
Payer: COMMERCIAL

## 2021-10-22 ENCOUNTER — OFFICE VISIT (OUTPATIENT)
Dept: FAMILY MEDICINE CLINIC | Facility: CLINIC | Age: 56
End: 2021-10-22
Payer: COMMERCIAL

## 2021-10-22 ENCOUNTER — APPOINTMENT (EMERGENCY)
Dept: RADIOLOGY | Facility: HOSPITAL | Age: 56
End: 2021-10-22
Payer: COMMERCIAL

## 2021-10-22 VITALS
SYSTOLIC BLOOD PRESSURE: 110 MMHG | RESPIRATION RATE: 16 BRPM | HEART RATE: 91 BPM | BODY MASS INDEX: 26.66 KG/M2 | DIASTOLIC BLOOD PRESSURE: 80 MMHG | TEMPERATURE: 97.5 F | HEIGHT: 65 IN | OXYGEN SATURATION: 98 % | WEIGHT: 160 LBS

## 2021-10-22 VITALS
RESPIRATION RATE: 16 BRPM | OXYGEN SATURATION: 97 % | SYSTOLIC BLOOD PRESSURE: 110 MMHG | TEMPERATURE: 96.7 F | DIASTOLIC BLOOD PRESSURE: 65 MMHG | HEART RATE: 80 BPM

## 2021-10-22 DIAGNOSIS — L03.213 PERIORBITAL CELLULITIS OF LEFT EYE: Primary | ICD-10-CM

## 2021-10-22 DIAGNOSIS — L03.213 PRESEPTAL CELLULITIS OF LEFT EYE: Primary | ICD-10-CM

## 2021-10-22 DIAGNOSIS — R60.0 PERIORBITAL EDEMA OF LEFT EYE: ICD-10-CM

## 2021-10-22 LAB
ANION GAP SERPL CALCULATED.3IONS-SCNC: 9 MMOL/L (ref 4–13)
BASOPHILS # BLD AUTO: 0.1 THOUSANDS/ΜL (ref 0–0.1)
BASOPHILS NFR BLD AUTO: 1 % (ref 0–1)
BUN SERPL-MCNC: 7 MG/DL (ref 5–25)
CALCIUM SERPL-MCNC: 9.1 MG/DL (ref 8.3–10.1)
CHLORIDE SERPL-SCNC: 99 MMOL/L (ref 100–108)
CO2 SERPL-SCNC: 26 MMOL/L (ref 21–32)
CREAT SERPL-MCNC: 0.74 MG/DL (ref 0.6–1.3)
EOSINOPHIL # BLD AUTO: 0.06 THOUSAND/ΜL (ref 0–0.61)
EOSINOPHIL NFR BLD AUTO: 1 % (ref 0–6)
ERYTHROCYTE [DISTWIDTH] IN BLOOD BY AUTOMATED COUNT: 14.6 % (ref 11.6–15.1)
GFR SERPL CREATININE-BSD FRML MDRD: 92 ML/MIN/1.73SQ M
GLUCOSE SERPL-MCNC: 107 MG/DL (ref 65–140)
HCT VFR BLD AUTO: 40.5 % (ref 34.8–46.1)
HGB BLD-MCNC: 13.4 G/DL (ref 11.5–15.4)
IMM GRANULOCYTES # BLD AUTO: 0.03 THOUSAND/UL (ref 0–0.2)
IMM GRANULOCYTES NFR BLD AUTO: 0 % (ref 0–2)
LYMPHOCYTES # BLD AUTO: 1.66 THOUSANDS/ΜL (ref 0.6–4.47)
LYMPHOCYTES NFR BLD AUTO: 22 % (ref 14–44)
MCH RBC QN AUTO: 29.2 PG (ref 26.8–34.3)
MCHC RBC AUTO-ENTMCNC: 33.1 G/DL (ref 31.4–37.4)
MCV RBC AUTO: 88 FL (ref 82–98)
MONOCYTES # BLD AUTO: 0.48 THOUSAND/ΜL (ref 0.17–1.22)
MONOCYTES NFR BLD AUTO: 6 % (ref 4–12)
NEUTROPHILS # BLD AUTO: 5.28 THOUSANDS/ΜL (ref 1.85–7.62)
NEUTS SEG NFR BLD AUTO: 70 % (ref 43–75)
NRBC BLD AUTO-RTO: 0 /100 WBCS
PLATELET # BLD AUTO: 345 THOUSANDS/UL (ref 149–390)
PMV BLD AUTO: 9.2 FL (ref 8.9–12.7)
POTASSIUM SERPL-SCNC: 4 MMOL/L (ref 3.5–5.3)
RBC # BLD AUTO: 4.59 MILLION/UL (ref 3.81–5.12)
SODIUM SERPL-SCNC: 134 MMOL/L (ref 136–145)
WBC # BLD AUTO: 7.61 THOUSAND/UL (ref 4.31–10.16)

## 2021-10-22 PROCEDURE — 70481 CT ORBIT/EAR/FOSSA W/DYE: CPT

## 2021-10-22 PROCEDURE — G1004 CDSM NDSC: HCPCS

## 2021-10-22 PROCEDURE — 99213 OFFICE O/P EST LOW 20 MIN: CPT | Performed by: NURSE PRACTITIONER

## 2021-10-22 PROCEDURE — 99284 EMERGENCY DEPT VISIT MOD MDM: CPT

## 2021-10-22 PROCEDURE — 96365 THER/PROPH/DIAG IV INF INIT: CPT

## 2021-10-22 PROCEDURE — 80048 BASIC METABOLIC PNL TOTAL CA: CPT | Performed by: PHYSICIAN ASSISTANT

## 2021-10-22 PROCEDURE — 36415 COLL VENOUS BLD VENIPUNCTURE: CPT | Performed by: PHYSICIAN ASSISTANT

## 2021-10-22 PROCEDURE — 96375 TX/PRO/DX INJ NEW DRUG ADDON: CPT

## 2021-10-22 PROCEDURE — 85025 COMPLETE CBC W/AUTO DIFF WBC: CPT | Performed by: PHYSICIAN ASSISTANT

## 2021-10-22 PROCEDURE — 96361 HYDRATE IV INFUSION ADD-ON: CPT

## 2021-10-22 PROCEDURE — 99284 EMERGENCY DEPT VISIT MOD MDM: CPT | Performed by: PHYSICIAN ASSISTANT

## 2021-10-22 PROCEDURE — 3008F BODY MASS INDEX DOCD: CPT | Performed by: NURSE PRACTITIONER

## 2021-10-22 RX ORDER — MOXIFLOXACIN 5 MG/ML
1 SOLUTION/ DROPS OPHTHALMIC 3 TIMES DAILY
Qty: 3 ML | Refills: 0 | Status: SHIPPED | OUTPATIENT
Start: 2021-10-22 | End: 2021-10-29

## 2021-10-22 RX ORDER — METHYLPREDNISOLONE SODIUM SUCCINATE 125 MG/2ML
80 INJECTION, POWDER, LYOPHILIZED, FOR SOLUTION INTRAMUSCULAR; INTRAVENOUS ONCE
Status: COMPLETED | OUTPATIENT
Start: 2021-10-22 | End: 2021-10-22

## 2021-10-22 RX ORDER — METHYLPREDNISOLONE 4 MG/1
TABLET ORAL
Qty: 21 TABLET | Refills: 0 | Status: SHIPPED | OUTPATIENT
Start: 2021-10-22 | End: 2021-12-01

## 2021-10-22 RX ORDER — CLINDAMYCIN PHOSPHATE 600 MG/50ML
600 INJECTION INTRAVENOUS ONCE
Status: COMPLETED | OUTPATIENT
Start: 2021-10-22 | End: 2021-10-22

## 2021-10-22 RX ORDER — TETRACAINE HYDROCHLORIDE 5 MG/ML
2 SOLUTION OPHTHALMIC ONCE
Status: COMPLETED | OUTPATIENT
Start: 2021-10-22 | End: 2021-10-22

## 2021-10-22 RX ORDER — CLINDAMYCIN HYDROCHLORIDE 300 MG/1
300 CAPSULE ORAL 3 TIMES DAILY
Qty: 28 CAPSULE | Refills: 0 | Status: SHIPPED | OUTPATIENT
Start: 2021-10-22 | End: 2021-11-01

## 2021-10-22 RX ORDER — SULFAMETHOXAZOLE AND TRIMETHOPRIM 800; 160 MG/1; MG/1
1 TABLET ORAL EVERY 12 HOURS SCHEDULED
Qty: 14 TABLET | Refills: 0 | OUTPATIENT
Start: 2021-10-22 | End: 2021-10-22

## 2021-10-22 RX ADMIN — FLUORESCEIN SODIUM 1 STRIP: 1 STRIP OPHTHALMIC at 12:03

## 2021-10-22 RX ADMIN — TETRACAINE HYDROCHLORIDE 2 DROP: 5 SOLUTION OPHTHALMIC at 12:03

## 2021-10-22 RX ADMIN — SODIUM CHLORIDE 500 ML: 0.9 INJECTION, SOLUTION INTRAVENOUS at 11:37

## 2021-10-22 RX ADMIN — IOHEXOL 85 ML: 350 INJECTION, SOLUTION INTRAVENOUS at 12:43

## 2021-10-22 RX ADMIN — METHYLPREDNISOLONE SODIUM SUCCINATE 80 MG: 125 INJECTION, POWDER, FOR SOLUTION INTRAMUSCULAR; INTRAVENOUS at 11:39

## 2021-10-22 RX ADMIN — CLINDAMYCIN PHOSPHATE 600 MG: 600 INJECTION, SOLUTION INTRAVENOUS at 11:40

## 2021-10-26 ENCOUNTER — VBI (OUTPATIENT)
Dept: FAMILY MEDICINE CLINIC | Facility: CLINIC | Age: 56
End: 2021-10-26

## 2021-12-01 ENCOUNTER — OFFICE VISIT (OUTPATIENT)
Dept: FAMILY MEDICINE CLINIC | Facility: CLINIC | Age: 56
End: 2021-12-01
Payer: COMMERCIAL

## 2021-12-01 VITALS
DIASTOLIC BLOOD PRESSURE: 78 MMHG | OXYGEN SATURATION: 98 % | TEMPERATURE: 97.3 F | HEIGHT: 65 IN | WEIGHT: 162 LBS | BODY MASS INDEX: 26.99 KG/M2 | HEART RATE: 80 BPM | RESPIRATION RATE: 18 BRPM | SYSTOLIC BLOOD PRESSURE: 110 MMHG

## 2021-12-01 DIAGNOSIS — H05.229 ORBITAL SWELLING: Primary | ICD-10-CM

## 2021-12-01 DIAGNOSIS — E78.2 MIXED HYPERLIPIDEMIA: ICD-10-CM

## 2021-12-01 DIAGNOSIS — R73.09 ABNORMAL GLUCOSE: ICD-10-CM

## 2021-12-01 PROCEDURE — 99213 OFFICE O/P EST LOW 20 MIN: CPT | Performed by: FAMILY MEDICINE

## 2021-12-01 PROCEDURE — 3008F BODY MASS INDEX DOCD: CPT | Performed by: FAMILY MEDICINE

## 2021-12-07 ENCOUNTER — TELEPHONE (OUTPATIENT)
Dept: FAMILY MEDICINE CLINIC | Facility: CLINIC | Age: 56
End: 2021-12-07

## 2022-01-31 ENCOUNTER — OFFICE VISIT (OUTPATIENT)
Dept: FAMILY MEDICINE CLINIC | Facility: CLINIC | Age: 57
End: 2022-01-31
Payer: COMMERCIAL

## 2022-01-31 VITALS
TEMPERATURE: 97.4 F | OXYGEN SATURATION: 97 % | DIASTOLIC BLOOD PRESSURE: 66 MMHG | SYSTOLIC BLOOD PRESSURE: 110 MMHG | WEIGHT: 163 LBS | HEIGHT: 65 IN | RESPIRATION RATE: 16 BRPM | BODY MASS INDEX: 27.16 KG/M2 | HEART RATE: 88 BPM

## 2022-01-31 DIAGNOSIS — M54.2 NECK PAIN, MUSCULOSKELETAL: ICD-10-CM

## 2022-01-31 DIAGNOSIS — D17.1 LIPOMA OF TORSO: Primary | ICD-10-CM

## 2022-01-31 PROCEDURE — 99213 OFFICE O/P EST LOW 20 MIN: CPT | Performed by: FAMILY MEDICINE

## 2022-01-31 PROCEDURE — 3008F BODY MASS INDEX DOCD: CPT | Performed by: FAMILY MEDICINE

## 2022-01-31 RX ORDER — CYCLOBENZAPRINE HCL 5 MG
5 TABLET ORAL
Qty: 15 TABLET | Refills: 1 | Status: SHIPPED | OUTPATIENT
Start: 2022-01-31 | End: 2022-03-04

## 2022-01-31 NOTE — PROGRESS NOTES
Assessment/Plan:    1  Lipoma of torso  Comments:  New  Orders:  -     Ambulatory referral to General Surgery; Future    2  Neck pain, musculoskeletal  -     cyclobenzaprine (FLEXERIL) 5 mg tablet; Take 1 tablet (5 mg total) by mouth daily at bedtime as needed for muscle spasms  -     Ambulatory referral to Physical Therapy; Future            Last colonoscopy was 2 years, copy of report requested     There are no Patient Instructions on file for this visit  Return if symptoms worsen or fail to improve  Subjective:      Patient ID: Rachele Cabrera is a 49 y o  female  Chief Complaint   Patient presents with    pinched nerve    lump near spine       She had neck surgery in 2005  For the past month she has had flaring neck pain  She has tried Tylenol and it is not helping  She has a massage and they found a lump  The pain is worse on the left and it is radiating down her arms  The following portions of the patient's history were reviewed and updated as appropriate:  past social history    Review of Systems      Current Outpatient Medications   Medication Sig Dispense Refill    EPINEPHrine (EPIPEN) 0 3 mg/0 3 mL SOAJ Inject 0 3 mL (0 3 mg total) into a muscle once for 1 dose As needed for anaphylaxis, proceed to ER 1 each 1    FLUoxetine (PROzac) 10 mg capsule TAKE ONE CAPSULE BY MOUTH EVERY DAY 90 capsule 1    cyclobenzaprine (FLEXERIL) 5 mg tablet Take 1 tablet (5 mg total) by mouth daily at bedtime as needed for muscle spasms 15 tablet 1     No current facility-administered medications for this visit  Objective:    /66   Pulse 88   Temp (!) 97 4 °F (36 3 °C)   Resp 16   Ht 5' 5" (1 651 m)   Wt 73 9 kg (163 lb)   SpO2 97%   BMI 27 12 kg/m²      Physical Exam  Vitals and nursing note reviewed  Constitutional:       Appearance: She is well-developed  HENT:      Head: Normocephalic and atraumatic        Right Ear: Tympanic membrane and external ear normal       Left Ear: Tympanic membrane and external ear normal    Cardiovascular:      Rate and Rhythm: Normal rate and regular rhythm  Heart sounds: Normal heart sounds  No murmur heard  No friction rub  Pulmonary:      Effort: Pulmonary effort is normal  No respiratory distress  Breath sounds: Normal breath sounds  No wheezing or rales  Musculoskeletal:         General: Tenderness (Bilateral trapezius) present  Right lower leg: No edema  Left lower leg: No edema     Skin:     Comments: Soft tissue lump right lower back             Patricia Marin DO

## 2022-02-07 ENCOUNTER — EVALUATION (OUTPATIENT)
Dept: PHYSICAL THERAPY | Facility: CLINIC | Age: 57
End: 2022-02-07
Payer: COMMERCIAL

## 2022-02-07 DIAGNOSIS — M54.2 NECK PAIN, MUSCULOSKELETAL: Primary | ICD-10-CM

## 2022-02-07 PROCEDURE — 97110 THERAPEUTIC EXERCISES: CPT

## 2022-02-07 PROCEDURE — 97162 PT EVAL MOD COMPLEX 30 MIN: CPT

## 2022-02-07 NOTE — PROGRESS NOTES
PT Evaluation     Today's date: 2022  Patient name: Lydia Talamantes  : 1965  MRN: [id number]  Referring provider: Germania Goncalves DO  Dx:   Encounter Diagnosis     ICD-10-CM    1  Neck pain, musculoskeletal  M54 2 Ambulatory referral to Physical Therapy                  Assessment  Assessment details: Pt is a 49 y o  female who presents to PT for evaluation of cervical pain onset several weeks ago, no SONA  Primary functional impairments include turning head to drive, prolonged sitting at work  She presents with impairments including weakness of middle and lower traps, increased median n tension, trigger points to L mid and lower traps, and decreased cervical ROM  Pt would benefit from skilled PT to reduce pain, restore cervical ROM, eliminate trigger points and increased neural tension in order to maximize pain free functional mobility  Pt was educated regarding physical therapy diagnosis and the recommended plan of care, as well as the potential risks and benefits of treatment  Impairments: abnormal or restricted ROM, impaired physical strength, lacks appropriate home exercise program, pain with function and poor posture   Functional limitations: turning head, prolonged sitting  Symptom irritability: moderateUnderstanding of Dx/Px/POC: good   Prognosis: good    Goals  STG (3 weeks)  1  Pt to be I in HEP  2 Pt to reduce pain at rest by 50%  LTG (By DC)  1 Pt to reduce pain with activity by 50%  2 Pt to improve FOTO score to predicted dc value  3  Pt to manage symptoms independently        Plan  Patient would benefit from: skilled physical therapy  Planned therapy interventions: strengthening, stretching, therapeutic activities, therapeutic exercise, flexibility, graded activity, functional ROM exercises, graded exercise, home exercise program, patient education, postural training, body mechanics training, behavior modification, balance/weight bearing training, balance, ADL retraining, ADL training, activity modification, abdominal trunk stabilization, IADL retraining, joint mobilization, manual therapy, massage, East taping, muscle pump exercises and neuromuscular re-education  Frequency: 1x week  Duration in weeks: 6  Plan of Care beginning date: 2/7/2022  Plan of Care expiration date: 4/29/2022  Treatment plan discussed with: patient        Subjective    Etiology of symptoms: No specific SONA  Nature of Pain: L UT and interscapular pain  Pain is intermittent  Primarily dull ache, occasionally sharp  Intermittent paresthesias in forearm and hand  Current Pain: 8/10  At Best: 1/10  At Worst: 9/10  Aggravating Factors: Sitting, driving, sleeping, lifting, turning to L  Relieving Factors: Heat  Irritability: moderate  Stability: Stable    Occupation: Working    Primary functional impairments:  1  Sitting  2  Driving  3  Sleeping    Patient Goals:  Feel better, to be able to move, "be normal"    Objective     Red Flags: Negative for night pain, unexplained weight loss, bowel or bladder dysfunction, saddle anesthesia, hx of Ca, fever, chills, N/V, changes in vision, Headaches, dizziness, gait disturbances    Neuro Screen:  Dermatomes:  In tact  Myotomes: WNL  Jimenez's: Neg    Range of Motion:    Cervical ROM  Flexion 25% LOM p   Extension 50% LOM p   R Lateral Flexion 50% LOM p   L Lateral Flexion 25% LOM p   R Rotation 50% LOM p   L Rotation 25% LOM p     Manual Muscle Testing:    Mid trap 4- L 4 R  Lat Dorsi 4- L 4 R  Lower trap 4- L 4 R    Special Testing:  (+) spurlings, median n tension  (-) distraction, ulnar n tension, radial n tension     Joint Play:  Hypomobile thoracic spine    Palpation:  TTP L upper, middle, lower trap       Precautions: None      Manuals                                                                 Neuro Re-Ed             Median n glide             Prone I, Y, T             Posture on pball w B ER                                                                 Ther Ex L Upper trap stretch             Rhomboid stretch             L cervical rot snag                                                                              Ther Activity                                       Gait Training                                       Modalities

## 2022-02-14 ENCOUNTER — OFFICE VISIT (OUTPATIENT)
Dept: PHYSICAL THERAPY | Facility: CLINIC | Age: 57
End: 2022-02-14
Payer: COMMERCIAL

## 2022-02-14 DIAGNOSIS — M54.2 NECK PAIN, MUSCULOSKELETAL: Primary | ICD-10-CM

## 2022-02-14 PROCEDURE — 97110 THERAPEUTIC EXERCISES: CPT

## 2022-02-14 PROCEDURE — 97140 MANUAL THERAPY 1/> REGIONS: CPT

## 2022-02-14 PROCEDURE — 97112 NEUROMUSCULAR REEDUCATION: CPT

## 2022-02-14 NOTE — PROGRESS NOTES
Daily Note     Today's date: 2022  Patient name: Lewis Paredes  : 1965  MRN: [id number]  Referring provider: Kay Villar DO  Dx:   Encounter Diagnosis     ICD-10-CM    1  Neck pain, musculoskeletal  M54 2                   Subjective: Patient reports increase in pain over the past few days  Reports compliance with HEP  Objective: See treatment diary below      Assessment: Tolerated treatment fair  Patient reports intermittent discomfort with L cervical rotation snags  Tolerated prone scap strengthening well  Patient declined theraband exercises due to concerns that non-latex bands may still give her an allergic reaction  Patient additionally reports some scapular and shoulder pain with suboccipital release  Patient exhibited good technique with therapeutic exercises and would benefit from continued PT  Plan: Progress treatment as tolerated         Precautions: None      Manuals             R rhomboid release SD            Suboccipital release SD                                      Neuro Re-Ed             Median n glide 2x10            Prone I, Y, T 10x ea            Posture on pball w B ER             Scap retraction iso 10x                                                    Ther Ex             L Upper trap stretch 10s x 5            Rhomboid stretch             L cervical rot snag 5s x 10+                                                                             Ther Activity                                       Gait Training                                       Modalities             Hot pack 10'

## 2022-02-22 ENCOUNTER — OFFICE VISIT (OUTPATIENT)
Dept: OBGYN CLINIC | Facility: CLINIC | Age: 57
End: 2022-02-22
Payer: COMMERCIAL

## 2022-02-22 ENCOUNTER — APPOINTMENT (OUTPATIENT)
Dept: RADIOLOGY | Facility: CLINIC | Age: 57
End: 2022-02-22
Payer: COMMERCIAL

## 2022-02-22 VITALS
SYSTOLIC BLOOD PRESSURE: 111 MMHG | HEIGHT: 65 IN | HEART RATE: 80 BPM | BODY MASS INDEX: 27.16 KG/M2 | TEMPERATURE: 97.6 F | DIASTOLIC BLOOD PRESSURE: 73 MMHG | WEIGHT: 163 LBS

## 2022-02-22 DIAGNOSIS — M54.2 NECK PAIN: ICD-10-CM

## 2022-02-22 DIAGNOSIS — M54.12 RADICULOPATHY, CERVICAL REGION: Primary | ICD-10-CM

## 2022-02-22 DIAGNOSIS — S29.012A STRAIN OF RHOMBOID MUSCLE, INITIAL ENCOUNTER: ICD-10-CM

## 2022-02-22 DIAGNOSIS — M77.12 LATERAL EPICONDYLITIS OF LEFT ELBOW: ICD-10-CM

## 2022-02-22 DIAGNOSIS — Z98.1 HISTORY OF FUSION OF CERVICAL SPINE: ICD-10-CM

## 2022-02-22 PROCEDURE — 99204 OFFICE O/P NEW MOD 45 MIN: CPT | Performed by: ORTHOPAEDIC SURGERY

## 2022-02-22 PROCEDURE — 72040 X-RAY EXAM NECK SPINE 2-3 VW: CPT

## 2022-02-22 RX ORDER — PREDNISONE 20 MG/1
20 TABLET ORAL DAILY
Qty: 5 TABLET | Refills: 0 | Status: SHIPPED | OUTPATIENT
Start: 2022-02-22 | End: 2022-02-27

## 2022-02-22 NOTE — PROGRESS NOTES
Assessment/Plan:  1  Radiculopathy, cervical region  predniSONE 20 mg tablet   2  Strain of rhomboid muscle, initial encounter  XR spine cervical 2 or 3 vw injury   3  History of fusion of cervical spine  Ambulatory Referral to Orthopedic Surgery   4  Lateral epicondylitis of left elbow       Misael Ricks has left-sided arm and neck pain that seems to be very diffuse  She has tenderness throughout her entire periscapular region, trapezius, shoulder and left upper extremity  Is very difficult to discern where her pain is coming from  She does have a history of cervical fusion and may have an element of radiculopathy however that is not obvious today  Her x-rays are within normal limits and shows stable hardware  I have recommended continue with physical therapy and focusing mostly on her trapezius and periscapular region as well as treatment of her cervical spine  She seems to have discomfort down her shoulder which may be muscular in nature associated with shoulder impingement as well as lateral epicondylitis  I do think all these issues can improve with conservative measures and recommended continued physical therapy  We did prescribe a short course of a burst dose of steroids to help reduce her discomfort  If her pain persists or worsens despite PT then we could consider MRI with metal suppression protocol  Subjective:   El Martinez is a 56 y o  female who presents with left arm and shoulder pain  She states that she has been having pain for the past 2 months  She has gone to two physical therapy sessions  She notes that the pain originates in the left side of her upper shoulder and radiates down her left arm  It will cause the sensation of pins and needles in her left arm and hand  Of note, she had cervical spinal fusion of C4-6 in 2005  She has had follow-up on her cervical spine and had MRIs in the past without any change in her hardware      Today she notes that the pain has gotten so bad that it makes everything she tries to do very difficult and painful  She is unable to sleep because she can't find a comfortable position  She cannot take oral anti-inflammatory medications due to an aspirin allergy in the past   She has only tried Tylenol and started physical therapy  She denies any recent trauma to her neck or any other new injury  She states that she has pain radiating down the entire left arm into her hand  She points to the lateral portion of her humerus and left elbow and states pain seems to persist at these locations as well  Review of Systems   Constitutional: Negative for chills and fever  Respiratory: Negative for shortness of breath and wheezing  Musculoskeletal: Positive for neck pain and neck stiffness  Negative for back pain, gait problem and joint swelling  Neurological: Positive for weakness and numbness  Negative for dizziness, light-headedness and headaches           Past Medical History:   Diagnosis Date    Anxiety     Chronic sinusitis     Depression     GERD (gastroesophageal reflux disease)        Past Surgical History:   Procedure Laterality Date    CERVICAL FUSION      HERNIA REPAIR Left     inquinal    OVARY SURGERY Right     oopherectomy     TOE SURGERY         Family History   Problem Relation Age of Onset    COPD Mother     No Known Problems Father     Breast cancer Maternal Grandmother     Breast cancer Cousin     Mental illness Neg Hx        Social History     Occupational History    Not on file   Tobacco Use    Smoking status: Current Every Day Smoker     Packs/day: 0 50     Types: Cigarettes    Smokeless tobacco: Never Used   Vaping Use    Vaping Use: Never used   Substance and Sexual Activity    Alcohol use: Not Currently    Drug use: Never    Sexual activity: Not on file         Current Outpatient Medications:     cyclobenzaprine (FLEXERIL) 5 mg tablet, Take 1 tablet (5 mg total) by mouth daily at bedtime as needed for muscle spasms, Disp: 15 tablet, Rfl: 1    FLUoxetine (PROzac) 10 mg capsule, TAKE ONE CAPSULE BY MOUTH EVERY DAY, Disp: 90 capsule, Rfl: 1    EPINEPHrine (EPIPEN) 0 3 mg/0 3 mL SOAJ, Inject 0 3 mL (0 3 mg total) into a muscle once for 1 dose As needed for anaphylaxis, proceed to ER, Disp: 1 each, Rfl: 1    Allergies   Allergen Reactions    Keflex [Cephalexin] Anaphylaxis    Penicillins Facial Swelling    Aspirin Other (See Comments)     Unknown - childhood    Latex Rash    Other Rash     Nitrile       Objective:  Vitals:    02/22/22 1736   BP: 111/73   Pulse: 80   Temp: 97 6 °F (36 4 °C)       Left Elbow Exam     Tenderness   The patient is experiencing tenderness in the lateral epicondyle  Range of Motion   Extension: normal   Flexion: normal   Pronation: normal   Supination: normal     Other   Erythema: absent  Sensation: normal  Pulse: present      Left Shoulder Exam     Tenderness   Left shoulder tenderness location: Tenderness to palpation over medial scapular border and rhomboid musculature, trapezius, deltoid, triceps muscle  Range of Motion   Active abduction: normal   Passive abduction: normal   Extension: normal   External rotation: normal   Forward flexion: normal   Internal rotation 0 degrees: normal   Internal rotation 90 degrees: normal     Muscle Strength   Abduction: 5/5   Internal rotation: 5/5   External rotation: 5/5   Supraspinatus: 5/5   Subscapularis: 5/5   Biceps: 5/5     Other   Erythema: absent  Sensation: normal             Physical Exam  Vitals and nursing note reviewed  Constitutional:       Appearance: She is well-developed  HENT:      Head: Normocephalic and atraumatic  Eyes:      General: No scleral icterus       Conjunctiva/sclera: Conjunctivae normal    Neck:        Comments: Weakness with left-sided  strength 3/5 compared to right 5/5, weakness with thumb opposition 3/5 on the left compared to 5/5 on right potentially limited by patient's pain    Negative Spurling's maneuver on the left  Cardiovascular:      Rate and Rhythm: Normal rate  Pulmonary:      Effort: Pulmonary effort is normal  No respiratory distress  Musculoskeletal:      Cervical back: Neck supple  Muscular tenderness present  No spinous process tenderness  Decreased range of motion  Comments: As noted in HPI   Skin:     General: Skin is warm and dry  Neurological:      Mental Status: She is alert and oriented to person, place, and time  Psychiatric:         Behavior: Behavior normal          I have personally reviewed pertinent films in PACS and my interpretation is as follows:  Cervical xray shows anterior cervical fusion hardware in the correct position   No fracture

## 2022-02-23 ENCOUNTER — OFFICE VISIT (OUTPATIENT)
Dept: PHYSICAL THERAPY | Facility: CLINIC | Age: 57
End: 2022-02-23
Payer: COMMERCIAL

## 2022-02-23 ENCOUNTER — CONSULT (OUTPATIENT)
Dept: SURGERY | Facility: CLINIC | Age: 57
End: 2022-02-23
Payer: COMMERCIAL

## 2022-02-23 VITALS
SYSTOLIC BLOOD PRESSURE: 106 MMHG | HEART RATE: 83 BPM | WEIGHT: 161.6 LBS | HEIGHT: 65 IN | OXYGEN SATURATION: 97 % | TEMPERATURE: 97.3 F | BODY MASS INDEX: 26.92 KG/M2 | DIASTOLIC BLOOD PRESSURE: 68 MMHG

## 2022-02-23 DIAGNOSIS — M54.2 NECK PAIN, MUSCULOSKELETAL: Primary | ICD-10-CM

## 2022-02-23 DIAGNOSIS — F41.9 ANXIETY: ICD-10-CM

## 2022-02-23 DIAGNOSIS — D17.1 LIPOMA OF BACK: Primary | ICD-10-CM

## 2022-02-23 DIAGNOSIS — T78.2XXA ANAPHYLACTIC REACTION: ICD-10-CM

## 2022-02-23 DIAGNOSIS — D17.1 LIPOMA OF TORSO: ICD-10-CM

## 2022-02-23 PROCEDURE — 97110 THERAPEUTIC EXERCISES: CPT

## 2022-02-23 PROCEDURE — 97140 MANUAL THERAPY 1/> REGIONS: CPT

## 2022-02-23 PROCEDURE — 97112 NEUROMUSCULAR REEDUCATION: CPT

## 2022-02-23 PROCEDURE — 99244 OFF/OP CNSLTJ NEW/EST MOD 40: CPT | Performed by: SPECIALIST

## 2022-02-23 NOTE — PROGRESS NOTES
Daily Note     Today's date: 2022  Patient name: Gelacio Traore  : 1965  MRN: [id number]  Referring provider: Doe Padilla DO  Dx:   Encounter Diagnosis     ICD-10-CM    1  Neck pain, musculoskeletal  M54 2                   Subjective: Patient reports she is feeling a little better  Objective: See treatment diary below      Assessment: Tolerated treatment well  Patient reports some discomfort with IASTM  Some pain in shoulder following session  Patient to add rhomboid stretch to HEP as well as self STM with tennis ball against the wall  Patient exhibited good technique with therapeutic exercises and would benefit from continued PT  Plan: Continue per plan of care  Re-assess next visit       Precautions: None      Manuals            R rhomboid release SD            Suboccipital release SD            IASTM L UT and rhomboids  SD                        Neuro Re-Ed             Median n glide 2x10 2x10 tensioner           Prone I, Y, T 10x ea 10x ea           Posture on pball w B ER             Scap retraction iso 10x                                                    Ther Ex             L Upper trap stretch 10s x 5            Rhomboid stretch  10s x 5           L cervical rot snag 5s x 10+            Pec stretch  10s x 5           Pball rollouts  10s x 5                                                  Ther Activity                                       Gait Training                                       Modalities             Hot pack 10' 10'

## 2022-02-23 NOTE — PROGRESS NOTES
History and Physical Examination - General Surgery   [facility]  Landry Torres 56 y o  female MRN: [id number]  Unit/Bed#:  Encounter: [id number] PCP: Ray Bravo DO    History of Present Illness   Chief Complaint:  I have a lump on my back which I want to have it excise I am no symptom    HPI:  Landry Torres is a 56 y o  female who presents to office with a history of latex allergy with anaphylactic reaction  Him to [facility] office for possible lipoma of the back which is asymptomatic was incidentally found and patient wants to have it excised    Extensive history of cervical neck pain and referred s/p anterior approach fixation of C-spine    Denies any incontinence of urine denies any incontinence of stool ambulating well there is no referred pain in the lower extremity    Historical Information   The following portions of the patient's history were reviewed and updated as appropriate  Past Medical History:   Diagnosis Date    Anxiety     Chronic sinusitis     Depression     GERD (gastroesophageal reflux disease)      Past Surgical History:   Procedure Laterality Date    CERVICAL FUSION      HERNIA REPAIR Left     inquinal    OVARY SURGERY Right     oopherectomy     TOE SURGERY       Social History   Social History     Substance and Sexual Activity   Alcohol Use Not Currently     Social History     Substance and Sexual Activity   Drug Use Never     Social History     Tobacco Use   Smoking Status Current Every Day Smoker    Packs/day: 0 50    Types: Cigarettes   Smokeless Tobacco Never Used     Family History:   Family History   Problem Relation Age of Onset    COPD Mother     No Known Problems Father     Breast cancer Maternal Grandmother     Breast cancer Cousin     Mental illness Neg Hx      Meds/Allergies   Allergies   Allergen Reactions    Keflex [Cephalexin] Anaphylaxis    Penicillins Facial Swelling    Aspirin Other (See Comments)     Unknown - childhood    Latex Rash  Other Rash     Nitrile       Current Outpatient Medications:     EPINEPHrine (EPIPEN) 0 3 mg/0 3 mL SOAJ, Inject 0 3 mL (0 3 mg total) into a muscle once for 1 dose As needed for anaphylaxis, proceed to ER, Disp: 1 each, Rfl: 1    FLUoxetine (PROzac) 10 mg capsule, TAKE ONE CAPSULE BY MOUTH EVERY DAY, Disp: 90 capsule, Rfl: 1    predniSONE 20 mg tablet, Take 1 tablet (20 mg total) by mouth daily for 5 days, Disp: 5 tablet, Rfl: 0    cyclobenzaprine (FLEXERIL) 5 mg tablet, Take 1 tablet (5 mg total) by mouth daily at bedtime as needed for muscle spasms (Patient not taking: Reported on 2/23/2022 ), Disp: 15 tablet, Rfl: 1     REVIEW OF SYSTEMS  Constitutional:  Denies fever or chills   Eyes:  Denies change in visual acuity   HENT:  Denies nasal congestion or sore throat   Respiratory:  Denies cough or shortness of breath   Cardiovascular:  Denies chest pain or edema   GI:  Denies abdominal pain, nausea, vomiting, bloody stools or diarrhea   :  Denies dysuria, frequency, difficulty in micturition and nocturia  Musculoskeletal:  Denies back pain or joint pain   I have a lipoma on my back  Neurologic:  Denies headache, focal weakness or sensory changes s/p C-spine surgery are recovering with physical therapy improving pain neck and radiation to the upper extent  Endocrine:  Denies polyuria or polydipsia   Lymphatic:  Denies swollen glands   Psychiatric:  Denies depression or anxiety     Objective   Current Vitals:   /68 (BP Location: Right arm, Patient Position: Sitting, Cuff Size: Large)   Pulse 83   Temp (!) 97 3 °F (36 3 °C) (Core)   Ht 5' 5" (1 651 m)   Wt 73 3 kg (161 lb 9 6 oz)   SpO2 97%   BMI 26 89 kg/m²   Body mass index is 26 89 kg/m²  PHYSICAL EXAMS  General:  Patient is not in acute distress, laying in the bed comfortably, awake, alert responding to commands,   HEENT:  Both pupils normal-size atraumatic, normocephalic, nonicteric  Neck:  JVP not raised   Trachea central  Respiratory: normal Breath sounds clear to auscultation,  Cardiovascular:  S1-S2 normal without any murmur   GI:  Abdomen soft   Musculoskeletal:  No back pain  On around T3-T4 and T2 3 cm x 2 cm soft the swelling  Close to midline possible lipoma no punctum identified  Integument:  No skin rashes or ulceration  Lymphatic:  No cervical or inguinal lymphadenopathy  Neurologic:  Patient is awake alert, responding to command, well-oriented to time and place and person moving all extremities ambulating well    Visit Diagnosis:   Diagnoses and all orders for this visit:    Lipoma of back  -     US superficial lump (non extremity); Future    Lipoma of torso  Comments:  New  Orders:  -     Ambulatory referral to General Surgery  -     US superficial lump (non extremity); Future    Anaphylactic reaction  -     US superficial lump (non extremity); Future    Anxiety  -     US superficial lump (non extremity); Future       Plan of care was discussed with patient in detail    Pertinent labs reviewed  Pertinent images and available reads personally reviewed  No results found  Pertinent notes reviewed    Assessment/Plan   Assessment:  Lipoma over the back asymptomatic  Severe allergic reaction to latex  Plan:    The risks, benefits, alternatives,and probabilities of success were discussed in detail with no guarantee made as to outcome  All questions were answered to the patient's satisfaction  Possible excision of the lipoma under local anesthesia in the hospital in view of severe latex allergy    Ultrasound of the soft tissue back  Follow-up in 2 week to schedule surgery    Counseling / Coordination of Care  Expained patient in detailed about coordination of care  A description of the counseling / coordination of care:  I performed an interim history, pertinent images and labs, performed a physical examination to arrive at the plan delineated above with associated thought processes         MD MS SHERLEY Munoz MIRIAM [facility] Mar 12    Office   Tel  [phone]  Fax   [phone]

## 2022-02-24 ENCOUNTER — OFFICE VISIT (OUTPATIENT)
Dept: FAMILY MEDICINE CLINIC | Facility: CLINIC | Age: 57
End: 2022-02-24
Payer: COMMERCIAL

## 2022-02-24 VITALS
TEMPERATURE: 99 F | BODY MASS INDEX: 26.99 KG/M2 | SYSTOLIC BLOOD PRESSURE: 98 MMHG | DIASTOLIC BLOOD PRESSURE: 60 MMHG | WEIGHT: 162 LBS | HEART RATE: 108 BPM | HEIGHT: 65 IN | RESPIRATION RATE: 16 BRPM

## 2022-02-24 DIAGNOSIS — B34.9 VIRAL INFECTION, UNSPECIFIED: Primary | ICD-10-CM

## 2022-02-24 DIAGNOSIS — R50.9 FEVER, UNSPECIFIED FEVER CAUSE: ICD-10-CM

## 2022-02-24 LAB
SL AMB  POCT GLUCOSE, UA: NEGATIVE
SL AMB LEUKOCYTE ESTERASE,UA: NEGATIVE
SL AMB POCT BILIRUBIN,UA: NEGATIVE
SL AMB POCT BLOOD,UA: ABNORMAL
SL AMB POCT CLARITY,UA: ABNORMAL
SL AMB POCT COLOR,UA: ABNORMAL
SL AMB POCT KETONES,UA: NEGATIVE
SL AMB POCT NITRITE,UA: NEGATIVE
SL AMB POCT PH,UA: 6
SL AMB POCT SPECIFIC GRAVITY,UA: 1.03
SL AMB POCT URINE PROTEIN: ABNORMAL
SL AMB POCT UROBILINOGEN: 0.2

## 2022-02-24 PROCEDURE — 3725F SCREEN DEPRESSION PERFORMED: CPT | Performed by: NURSE PRACTITIONER

## 2022-02-24 PROCEDURE — 87636 SARSCOV2 & INF A&B AMP PRB: CPT | Performed by: NURSE PRACTITIONER

## 2022-02-24 PROCEDURE — 99213 OFFICE O/P EST LOW 20 MIN: CPT | Performed by: NURSE PRACTITIONER

## 2022-02-24 PROCEDURE — 81003 URINALYSIS AUTO W/O SCOPE: CPT | Performed by: NURSE PRACTITIONER

## 2022-02-24 PROCEDURE — 3008F BODY MASS INDEX DOCD: CPT | Performed by: NURSE PRACTITIONER

## 2022-02-24 NOTE — PROGRESS NOTES
Assessment/Plan:    Fever unknown origin  Will send urine for culture  Will f/u with results of COVID/flu and see how she is feeling at that time  Consider additional workup depending on symptomology  1  Viral infection, unspecified  -     Covid/Flu- Office Collect    2  Fever, unspecified fever cause  -     POCT urine dip auto non-scope  -     Urine culture            There are no Patient Instructions on file for this visit  No follow-ups on file  Subjective:      Patient ID: Bennett Essex is a 56 y o  female  Chief Complaint   Patient presents with    Fever     Woke at 3 am with shaking chills, body aches, nausea, Temp 103 [id number]    Headache    Sore Throat       Overnight, developed a high grade fever over 103, with diffuse body aches, headaches, and chills  Had urge to move her bowels/diarrhea, but did not go  Also notes nausea, but no vomiting  Feels slightly congested in her sinuses and had a sore throat this morning  Denies runny nose, cough, SOB, or any urinary s/s  Normal BM yesterday  No no sick contacts  Home antigen test this morning was negative  Not vaccinated      The following portions of the patient's history were reviewed and updated as appropriate: allergies, current medications, past family history, past medical history, past social history, past surgical history and problem list     Review of Systems   Constitutional: Positive for chills, fatigue and fever (tmax 103 4)  HENT: Positive for sore throat  Negative for congestion, ear pain, postnasal drip, rhinorrhea and sinus pressure  Respiratory: Negative for cough, shortness of breath and wheezing  Cardiovascular: Negative for chest pain  Gastrointestinal: Positive for nausea  Negative for abdominal pain, diarrhea and vomiting  Musculoskeletal: Negative for arthralgias  Skin: Negative for rash  Neurological: Positive for headaches           Current Outpatient Medications   Medication Sig Dispense Refill    EPINEPHrine (EPIPEN) 0 3 mg/0 3 mL SOAJ Inject 0 3 mL (0 3 mg total) into a muscle once for 1 dose As needed for anaphylaxis, proceed to ER 1 each 1    FLUoxetine (PROzac) 10 mg capsule TAKE ONE CAPSULE BY MOUTH EVERY DAY 90 capsule 1    predniSONE 20 mg tablet Take 1 tablet (20 mg total) by mouth daily for 5 days 5 tablet 0    cyclobenzaprine (FLEXERIL) 5 mg tablet Take 1 tablet (5 mg total) by mouth daily at bedtime as needed for muscle spasms (Patient not taking: Reported on 2/23/2022 ) 15 tablet 1     No current facility-administered medications for this visit  Objective:    BP 98/60   Pulse (!) 108   Temp 99 °F (37 2 °C)   Resp 16   Ht 5' 5" (1 651 m)   Wt 73 5 kg (162 lb)   BMI 26 96 kg/m²        Physical Exam  Vitals and nursing note reviewed  Constitutional:       General: She is not in acute distress  Appearance: Normal appearance  She is well-developed  She is not ill-appearing  HENT:      Right Ear: Tympanic membrane normal       Left Ear: Tympanic membrane normal       Nose: Congestion present  Mouth/Throat:      Pharynx: Oropharynx is clear  No oropharyngeal exudate or posterior oropharyngeal erythema  Cardiovascular:      Rate and Rhythm: Normal rate and regular rhythm  Pulses: Normal pulses  Heart sounds: Normal heart sounds, S1 normal and S2 normal  No murmur heard  Pulmonary:      Effort: Pulmonary effort is normal       Breath sounds: Normal breath sounds  Abdominal:      General: Bowel sounds are normal  There is no distension  Tenderness: There is no abdominal tenderness  Musculoskeletal:      Cervical back: Normal range of motion  Lymphadenopathy:      Cervical: No cervical adenopathy  Skin:     General: Skin is warm and dry  Findings: No rash  Neurological:      Mental Status: She is alert     Psychiatric:         Mood and Affect: Mood normal          Behavior: Behavior normal                 Artice Arely, CRNP

## 2022-02-25 ENCOUNTER — TELEPHONE (OUTPATIENT)
Dept: FAMILY MEDICINE CLINIC | Facility: CLINIC | Age: 57
End: 2022-02-25

## 2022-02-25 LAB
FLUAV RNA RESP QL NAA+PROBE: NEGATIVE
FLUBV RNA RESP QL NAA+PROBE: NEGATIVE
SARS-COV-2 RNA RESP QL NAA+PROBE: POSITIVE

## 2022-02-25 NOTE — TELEPHONE ENCOUNTER
I spoke with pt, she is aware of her results and I advised her to hui tylenol if she has a fever as well was vitamin c,d and zinc and to isolate for 5 days  No further action needed   Marbin Paris LPN

## 2022-02-25 NOTE — TELEPHONE ENCOUNTER
----- Message from Shay Ashby sent at 2/25/2022 11:46 AM EST -----    ----- Message -----  From: Hawa Mccoy MA  Sent: 2/24/2022   3:52 PM EST  To: [place]

## 2022-02-25 NOTE — TELEPHONE ENCOUNTER
Called pt - she is covid positive - she was not available and her phone does not take messages? Can we tryt to get her - let her know her covid is positive  She is to self isolate for 5 days  Vt c,d and zinc would be helpful    Tylenol for fever

## 2022-02-26 LAB
BACTERIA UR CULT: NORMAL
Lab: NORMAL

## 2022-02-27 LAB
A ALTERNATA IGE QN: <0.1 KU/L
A FUMIGATUS IGE QN: <0.1 KU/L
ALBUMIN SERPL-MCNC: 4.7 G/DL (ref 3.8–4.9)
ALBUMIN/GLOB SERPL: 2.4 {RATIO} (ref 1.2–2.2)
ALP SERPL-CCNC: 54 IU/L (ref 44–121)
ALT SERPL-CCNC: 12 IU/L (ref 0–32)
AST SERPL-CCNC: 15 IU/L (ref 0–40)
BERMUDA GRASS IGE QN: <0.1 KU/L
BILIRUB SERPL-MCNC: 0.5 MG/DL (ref 0–1.2)
BOXELDER IGE QN: <0.1 KU/L
BUN SERPL-MCNC: 6 MG/DL (ref 6–24)
BUN/CREAT SERPL: 7 (ref 9–23)
C HERBARUM IGE QN: <0.1 KU/L
CALCIUM SERPL-MCNC: 10.1 MG/DL (ref 8.7–10.2)
CAT DANDER IGE QN: <0.1 KU/L
CHLORIDE SERPL-SCNC: 99 MMOL/L (ref 96–106)
CHOLEST SERPL-MCNC: 292 MG/DL (ref 100–199)
CMN PIGWEED IGE QN: <0.1 KU/L
CO2 SERPL-SCNC: 21 MMOL/L (ref 20–29)
CODFISH IGE QN: <0.1 KU/L
COMMON RAGWEED IGE QN: <0.1 KU/L
COTTONWOOD IGE QN: <0.1 KU/L
COW MILK IGE QN: <0.1 KU/L
CREAT SERPL-MCNC: 0.87 MG/DL (ref 0.57–1)
D FARINAE IGE QN: <0.1 KU/L
D PTERONYSS IGE QN: <0.1 KU/L
DOG DANDER IGE QN: <0.1 KU/L
EGG WHITE IGE QN: <0.1 KU/L
GLOBULIN SER-MCNC: 2 G/DL (ref 1.5–4.5)
GLUCOSE SERPL-MCNC: 102 MG/DL (ref 65–99)
HDLC SERPL-MCNC: 56 MG/DL
IGE SERPL-ACNC: 14 IU/ML (ref 6–495)
LDLC SERPL CALC-MCNC: 223 MG/DL (ref 0–99)
LONDON PLANE IGE QN: <0.1 KU/L
Lab: NORMAL
MICRODELETION SYND BLD/T FISH: NORMAL
MOUSE URINE PROT IGE QN: <0.1 KU/L
MT JUNIPER IGE QN: <0.1 KU/L
MUGWORT IGE QN: <0.1 KU/L
PEANUT (RARA H) 6 IGE QN: <0.1 KU/L
PENICILLIUM CHRYSOGENUM: <0.1 KU/L
POTASSIUM SERPL-SCNC: 4.5 MMOL/L (ref 3.5–5.2)
PROT SERPL-MCNC: 6.7 G/DL (ref 6–8.5)
ROACH IGE QN: <0.1 KU/L
SHEEP SORREL IGE QN: <0.1 KU/L
SILVER BIRCH IGE QN: <0.1 KU/L
SL AMB EGFR AFRICAN AMERICAN: 87 ML/MIN/1.73
SL AMB EGFR NON AFRICAN AMERICAN: 75 ML/MIN/1.73
SODIUM SERPL-SCNC: 134 MMOL/L (ref 134–144)
SOYBEAN IGE QN: <0.1 KU/L
TIMOTHY IGE QN: <0.1 KU/L
TRIGL SERPL-MCNC: 81 MG/DL (ref 0–149)
WALNUT IGE: <0.1 KU/L
WHEAT IGE QN: <0.1 KU/L
WHITE ASH IGE QN: <0.1 KU/L
WHITE ELM IGE QN: <0.1 KU/L
WHITE MULBERRY IGE QN: <0.1 KU/L
WHITE OAK IGE QN: <0.1 KU/L

## 2022-02-28 ENCOUNTER — APPOINTMENT (OUTPATIENT)
Dept: PHYSICAL THERAPY | Facility: CLINIC | Age: 57
End: 2022-02-28
Payer: COMMERCIAL

## 2022-03-04 ENCOUNTER — OFFICE VISIT (OUTPATIENT)
Dept: FAMILY MEDICINE CLINIC | Facility: CLINIC | Age: 57
End: 2022-03-04
Payer: COMMERCIAL

## 2022-03-04 ENCOUNTER — TELEPHONE (OUTPATIENT)
Dept: FAMILY MEDICINE CLINIC | Facility: CLINIC | Age: 57
End: 2022-03-04

## 2022-03-04 VITALS
HEART RATE: 89 BPM | TEMPERATURE: 97.9 F | DIASTOLIC BLOOD PRESSURE: 68 MMHG | WEIGHT: 156.8 LBS | OXYGEN SATURATION: 99 % | HEIGHT: 65 IN | RESPIRATION RATE: 18 BRPM | SYSTOLIC BLOOD PRESSURE: 118 MMHG | BODY MASS INDEX: 26.12 KG/M2

## 2022-03-04 DIAGNOSIS — E78.2 MIXED HYPERLIPIDEMIA: ICD-10-CM

## 2022-03-04 DIAGNOSIS — M54.12 CERVICAL RADICULOPATHY: ICD-10-CM

## 2022-03-04 DIAGNOSIS — Z98.1 HISTORY OF FUSION OF CERVICAL SPINE: ICD-10-CM

## 2022-03-04 DIAGNOSIS — R31.29 MICROSCOPIC HEMATURIA: Primary | ICD-10-CM

## 2022-03-04 DIAGNOSIS — N30.10 INTERSTITIAL CYSTITIS: ICD-10-CM

## 2022-03-04 PROCEDURE — 99214 OFFICE O/P EST MOD 30 MIN: CPT | Performed by: FAMILY MEDICINE

## 2022-03-04 RX ORDER — ATORVASTATIN CALCIUM 10 MG/1
10 TABLET, FILM COATED ORAL DAILY
Qty: 90 TABLET | Refills: 1 | Status: SHIPPED | OUTPATIENT
Start: 2022-03-04

## 2022-03-04 NOTE — TELEPHONE ENCOUNTER
Pt employer requesting a note for her to return to work since she had covid  Please call when letter is ready for

## 2022-03-04 NOTE — PROGRESS NOTES
Assessment/Plan:    1  Microscopic hematuria  -     UA (URINE) with reflex to Scope; Future  -     US kidney and bladder; Future; Expected date: 03/04/2022    2  Interstitial cystitis  -     UA (URINE) with reflex to Scope; Future  -     US kidney and bladder; Future; Expected date: 03/04/2022    3  Mixed hyperlipidemia  -     atorvastatin (LIPITOR) 10 mg tablet; Take 1 tablet (10 mg total) by mouth daily  -     Comprehensive metabolic panel; Future; Expected date: 05/18/2022  -     Lipid Panel with Direct LDL reflex; Future; Expected date: 05/18/2022    4  History of fusion of cervical spine    5  Cervical radiculopathy  -     MRI cervical spine w wo contrast; Future; Expected date: 03/04/2022            There are no Patient Instructions on file for this visit  Return in about 3 months (around 6/4/2022) for Recheck  Subjective:      Patient ID: Franky Laurent is a 56 y o  female  Chief Complaint   Patient presents with    lab review     nm lpn    Headache     head cold symptoms still from covid positive 10 days ago       Pt is sched to review labs    Pt states she had covid and wants to be looked at for that as well  States she is s/p day ten now  States she found she had issues with being shaky    Pt states at a previous appt you a dip of the urine and had blood in it - pt states she is concerned    I placed pt in the past on crestor 10mg and stated she felt she got leg cramps from that    Pt states she is seeing DR Liz Puentes and going to physical therapy - states she still has those issues  Pt has a history of a cervical spine fusion - now with radiculopathy that has gone frome the left arm to the right arm    Is doing physical therapy      The following portions of the patient's history were reviewed and updated as appropriate: allergies, current medications, past family history, past medical history, past social history, past surgical history and problem list     Review of Systems   Constitutional: Negative  Negative for activity change, appetite change, chills, diaphoresis and fatigue  HENT: Negative  Negative for dental problem, ear pain, sinus pressure and sore throat  Eyes: Negative  Negative for photophobia, pain, discharge, redness, itching and visual disturbance  Respiratory: Negative for apnea and chest tightness  Cardiovascular: Negative  Negative for chest pain, palpitations and leg swelling  Gastrointestinal: Negative  Negative for abdominal distention, abdominal pain, constipation and diarrhea  Endocrine: Negative  Negative for cold intolerance and heat intolerance  Genitourinary: Negative  Negative for difficulty urinating and dyspareunia  Musculoskeletal: Positive for arthralgias, myalgias, neck pain and neck stiffness  Negative for back pain  Skin: Negative  Allergic/Immunologic: Negative for environmental allergies  Neurological: Positive for numbness  Negative for dizziness  Psychiatric/Behavioral: Negative  Negative for agitation  Current Outpatient Medications   Medication Sig Dispense Refill    EPINEPHrine (EPIPEN) 0 3 mg/0 3 mL SOAJ Inject 0 3 mL (0 3 mg total) into a muscle once for 1 dose As needed for anaphylaxis, proceed to ER 1 each 1    FLUoxetine (PROzac) 10 mg capsule TAKE ONE CAPSULE BY MOUTH EVERY DAY 90 capsule 1    atorvastatin (LIPITOR) 10 mg tablet Take 1 tablet (10 mg total) by mouth daily 90 tablet 1     No current facility-administered medications for this visit         Objective:    /68   Pulse 89   Temp 97 9 °F (36 6 °C)   Resp 18   Ht 5' 5" (1 651 m)   Wt 71 1 kg (156 lb 12 8 oz)   SpO2 99%   BMI 26 09 kg/m²        Physical Exam  Musculoskeletal:      Comments: Some weakness in  in left upper extremity  Reflexes intact b/l upper extremity              Recent Results (from the past 672 hour(s))   Comprehensive metabolic panel    Collection Time: 02/23/22  7:07 AM   Result Value Ref Range    Glucose, Random 102 (H) 65 - 99 mg/dL    BUN 6 6 - 24 mg/dL    Creatinine 0 87 0 57 - 1 00 mg/dL    eGFR Non African American 75 >59 mL/min/1 73    eGFR  87 >59 mL/min/1 73    SL AMB BUN/CREATININE RATIO 7 (L) 9 - 23    Sodium 134 134 - 144 mmol/L    Potassium 4 5 3 5 - 5 2 mmol/L    Chloride 99 96 - 106 mmol/L    CO2 21 20 - 29 mmol/L    CALCIUM 10 1 8 7 - 10 2 mg/dL    Protein, Total 6 7 6 0 - 8 5 g/dL    Albumin 4 7 3 8 - 4 9 g/dL    Globulin, Total 2 0 1 5 - 4 5 g/dL    Albumin/Globulin Ratio 2 4 (H) 1 2 - 2 2    TOTAL BILIRUBIN 0 5 0 0 - 1 2 mg/dL    Alk Phos Isoenzymes 54 44 - 121 IU/L    AST 15 0 - 40 IU/L    ALT 12 0 - 32 IU/L   Lipid panel    Collection Time: 02/23/22  7:07 AM   Result Value Ref Range    Cholesterol, Total 292 (H) 100 - 199 mg/dL    Triglycerides 81 0 - 149 mg/dL    HDL 56 >39 mg/dL    LDL Calculated 223 (H) 0 - 99 mg/dL   Allergen, Respiratory Zone 1 Profile    Collection Time: 02/23/22  7:07 AM   Result Value Ref Range    Class Description Comment     Immunoglobulin E, Total 14 6 - 495 IU/mL    D  PTERONYSSINUS IGE <0 10 Class 0 kU/L    D  FARINAE <0 10 Class 0 kU/L    Cat Dander <0 10 Class 0 kU/L    DOG DANDER IGE <0 10 Class 0 kU/L    BERMUDA GRASS IGE <0 10 Class 0 kU/L    SUBHASH GRASS <0 10 Class 0 kU/L    COCKROACH, Omani <0 10 Class 0 kU/L    PENICILLIUM CHRYSOGENUM <0 10 Class 0 kU/L    Cladosporium Herbarum <0 10 Class 0 kU/L    Aspergillus fumigatus <0 10 Class 0 kU/L    Alternaria Alternata <0 10 Class 0 kU/L    MAPLE/BOX ELDER IGE <0 10 Class 0 kU/L    Birch <0 10 Class 0 kU/L    MOUNTAIN CEDAR <0 10 Class 0 kU/L    T007 Gardners, White <0 10 Class 0 kU/L    [place]m Tree <0 10 Class 0 kU/L    WALNUT <0 10 Class 0 kU/L    SYCAMORE MAPLE LEAF <0 10 Class 0 kU/L    [place] Tree <0 10 Class 0 kU/L    WHITE RAMON <0 10 Class 0 kU/L    T070 White [place] <0 10 Class 0 kU/L    Short Ragwd(A antonio ) IgE <0 10 Class 0 kU/L    MUGWORT IGE <0 10 Class 0 kU/L    COMMON PIGWEED <0 10 Class 0 kU/L SHEEP SORREL <0 10 Class 0 kU/L    Mouse Urine Proteins <0 10 Class 0 kU/L   IgE FOOD BASIC W/Component RFX    Collection Time: 02/23/22  7:07 AM   Result Value Ref Range    P673-TyO Egg White <0 10 Class 0 kU/L    Milk <0 10 Class 0 kU/L    Codfish IgE <0 10 Class 0 kU/L    Wheat IgE <0 10 Class 0 kU/L    A077-FUN PEANUT <0 10 Class 0 kU/L    V057-PcS Soybean <0 10 Class 0 kU/L   Cardiovascular Report    Collection Time: 02/23/22  7:07 AM   Result Value Ref Range    Interpretation Note    Covid/Flu- Office Collect    Collection Time: 02/24/22  3:40 PM    Specimen: Nose; Nares   Result Value Ref Range    SARS-CoV-2 Positive (A) Negative    INFLUENZA A PCR Negative Negative    INFLUENZA B PCR Negative Negative   Urine culture    Collection Time: 02/24/22  3:49 PM    Specimen: Urine, Clean Catch    Urine   Result Value Ref Range    Urine Culture Result Final report    Result    Collection Time: 02/24/22  3:49 PM   Result Value Ref Range    Result 1 Comment    POCT urine dip auto non-scope    Collection Time: 02/24/22  3:51 PM   Result Value Ref Range     COLOR,UA dark yellow     CLARITY,UA cloudy with seds     SPECIFIC GRAVITY,UA 1 030      PH,UA 6 0     LEUKOCYTE ESTERASE,UA negative     NITRITE,UA negative     GLUCOSE, UA negative     KETONES,UA negative     BILIRUBIN,UA negative     BLOOD,UA moderate     POCT URINE PROTEIN 30mg     SL AMB POCT UROBILINOGEN 0 2          Clarkrange Newness, DO

## 2022-03-05 ENCOUNTER — LAB (OUTPATIENT)
Dept: LAB | Facility: HOSPITAL | Age: 57
End: 2022-03-05
Attending: FAMILY MEDICINE
Payer: COMMERCIAL

## 2022-03-05 ENCOUNTER — HOSPITAL ENCOUNTER (OUTPATIENT)
Dept: RADIOLOGY | Facility: HOSPITAL | Age: 57
Discharge: HOME | End: 2022-03-05
Attending: SPECIALIST
Payer: COMMERCIAL

## 2022-03-05 ENCOUNTER — HOSPITAL ENCOUNTER (OUTPATIENT)
Dept: RADIOLOGY | Facility: HOSPITAL | Age: 57
Discharge: HOME | End: 2022-03-05
Attending: FAMILY MEDICINE
Payer: COMMERCIAL

## 2022-03-05 DIAGNOSIS — D17.1 LIPOMA OF TORSO: ICD-10-CM

## 2022-03-05 DIAGNOSIS — D17.1 LIPOMA OF BACK: ICD-10-CM

## 2022-03-05 DIAGNOSIS — N30.10 INTERSTITIAL CYSTITIS: ICD-10-CM

## 2022-03-05 DIAGNOSIS — F41.9 ANXIETY: ICD-10-CM

## 2022-03-05 DIAGNOSIS — T78.2XXA ANAPHYLACTIC REACTION: ICD-10-CM

## 2022-03-05 DIAGNOSIS — R31.29 MICROSCOPIC HEMATURIA: ICD-10-CM

## 2022-03-05 LAB
BACTERIA UR QL AUTO: NORMAL /HPF
BILIRUB UR QL STRIP: NEGATIVE
CLARITY UR: CLEAR
COLOR UR: ABNORMAL
GLUCOSE UR STRIP-MCNC: NEGATIVE MG/DL
HGB UR QL STRIP.AUTO: NEGATIVE
KETONES UR STRIP-MCNC: NEGATIVE MG/DL
LEUKOCYTE ESTERASE UR QL STRIP: ABNORMAL
NITRITE UR QL STRIP: NEGATIVE
NON-SQ EPI CELLS URNS QL MICRO: NORMAL /HPF
PH UR STRIP.AUTO: 6 [PH]
PROT UR STRIP-MCNC: NEGATIVE MG/DL
RBC #/AREA URNS AUTO: NORMAL /HPF
SP GR UR STRIP.AUTO: <=1.005 (ref 1–1.03)
UROBILINOGEN UR QL STRIP.AUTO: 0.2 E.U./DL
WBC #/AREA URNS AUTO: NORMAL /HPF

## 2022-03-05 PROCEDURE — 76770 US EXAM ABDO BACK WALL COMP: CPT

## 2022-03-05 PROCEDURE — 81001 URINALYSIS AUTO W/SCOPE: CPT

## 2022-03-05 PROCEDURE — 76705 ECHO EXAM OF ABDOMEN: CPT

## 2022-03-07 ENCOUNTER — APPOINTMENT (OUTPATIENT)
Dept: PHYSICAL THERAPY | Facility: CLINIC | Age: 57
End: 2022-03-07
Payer: COMMERCIAL

## 2022-03-07 NOTE — RESULT ENCOUNTER NOTE
No red blood cells seen on UA  Trace white blood cells - as you were not complaining of dysuria etc this trace finding is ok

## 2022-03-14 ENCOUNTER — APPOINTMENT (OUTPATIENT)
Dept: PHYSICAL THERAPY | Facility: CLINIC | Age: 57
End: 2022-03-14
Payer: COMMERCIAL

## 2022-03-16 ENCOUNTER — TELEPHONE (OUTPATIENT)
Dept: SURGERY | Facility: CLINIC | Age: 57
End: 2022-03-16

## 2022-03-16 NOTE — TELEPHONE ENCOUNTER
Patient left message on 3/15 for US results  Called patient on 3/16 left message to call office for follow up visit to discuss results

## 2022-03-24 ENCOUNTER — APPOINTMENT (OUTPATIENT)
Dept: PHYSICAL THERAPY | Facility: CLINIC | Age: 57
End: 2022-03-24
Payer: COMMERCIAL

## 2022-03-28 ENCOUNTER — EVALUATION (OUTPATIENT)
Dept: PHYSICAL THERAPY | Facility: CLINIC | Age: 57
End: 2022-03-28
Payer: COMMERCIAL

## 2022-03-28 DIAGNOSIS — Z98.1 HISTORY OF FUSION OF CERVICAL SPINE: ICD-10-CM

## 2022-03-28 DIAGNOSIS — M54.12 CERVICAL RADICULOPATHY: Primary | ICD-10-CM

## 2022-03-28 PROCEDURE — 97110 THERAPEUTIC EXERCISES: CPT

## 2022-03-28 NOTE — PROGRESS NOTES
PT Re-Evaluation     Today's date: 3/28/2022  Patient name: Fredy Gordon  : 1965  MRN: [id number]  Referring provider: Kinga Dutton DO  Dx:   Encounter Diagnosis     ICD-10-CM    1  Cervical radiculopathy  M54 12    2  History of fusion of cervical spine  Z98 1                   Assessment  Assessment details: Pt is a 49 y o  female who presents to PT for re-evaluation of cervical pain onset several months ago, no SONA  Patient had gap in care during PT due to gris COVID  Primary functional impairments include turning head to drive, prolonged sitting at work and sitting to drive  She presents with impairments including weakness of middle and lower traps, increased median, ulnar, and radial n tension, trigger points to L mid and lower traps, and decreased cervical ROM  Pt would benefit from continued skilled PT to reduce pain, restore cervical ROM, eliminate trigger points and increased neural tension in order to maximize pain free functional mobility  Pt was educated regarding physical therapy diagnosis and the recommended plan of care, as well as the potential risks and benefits of treatment  Impairments: abnormal or restricted ROM, impaired physical strength, lacks appropriate home exercise program, pain with function and poor posture   Functional limitations: turning head, prolonged sitting  Symptom irritability: moderateUnderstanding of Dx/Px/POC: good   Prognosis: good    Goals  STG (3 weeks)  1  Pt to be I in HEP  Met  2 Pt to reduce pain at rest by 50%  Not met  LTG (By DC)  1 Pt to reduce pain with activity by 50%  Not met  2 Pt to improve FOTO score to predicted dc value  Not met  3  Pt to manage symptoms independently  Not met        Plan  Patient would benefit from: skilled physical therapy  Planned therapy interventions: strengthening, stretching, therapeutic activities, therapeutic exercise, flexibility, graded activity, functional ROM exercises, graded exercise, home exercise program, patient education, postural training, body mechanics training, behavior modification, balance/weight bearing training, balance, ADL retraining, ADL training, activity modification, abdominal trunk stabilization, IADL retraining, joint mobilization, manual therapy, massage, East taping, muscle pump exercises and neuromuscular re-education  Frequency: 1x week  Duration in weeks: 6  Plan of Care beginning date: 2/7/2022  Plan of Care expiration date: 5/27/2022  Treatment plan discussed with: patient        Subjective    Etiology of symptoms: No specific SONA  Nature of Pain: L UT and interscapular pain  Pain is constant  Primarily dull ache, occasionally sharp  Intermittent paresthesias in forearm and hand  Current Pain: 7/10  At Best: 4/10  At Worst: 7/10  Aggravating Factors: Sitting, driving, sleeping, lifting, turning to L  Relieving Factors: Heat  Irritability: moderate  Stability: Stable    Occupation: Working    Primary functional impairments:  1  Sitting  2  Driving  3  Sleeping    Patient Goals:  Feel better, to be able to move, "be normal"    Objective     Red Flags: Negative for night pain, unexplained weight loss, bowel or bladder dysfunction, saddle anesthesia, hx of Ca, fever, chills, N/V, changes in vision, Headaches, dizziness, gait disturbances    Neuro Screen:  Dermatomes:  In tact  Myotomes: WNL  Jimenez's: Neg    Range of Motion:    Cervical ROM  Flexion 25% LOM   Extension 50% LOM p   R Lateral Flexion 50% LOM   L Lateral Flexion 25% LOM p   R Rotation 50% LOM   L Rotation 25% LOM p     Manual Muscle Testing:    Upper Quarter    Right Left   Shoulder flexion 5 4   Shoulder abduction 5 4   Shoulder internal rotation 4+ 4-   Shoulder external rotation 4+ 4-   Elbow flexion 4+ 4-   Elbow extension 4+ 4-   Wrist flexion 5 4+   Wrist extension 5 4+    strength 5 4       Mid trap 4- L 4 R  Lat Dorsi 4- L 4 R  Lower trap 4- L 4 R    Special Testing:  (+) spurlings, median n test, radial, ulnar n test  (-) distraction    Joint Play:  Hypomobile thoracic spine    Palpation:  TTP L upper, middle, lower trap       Precautions: None      Manuals 3/28                                                                Neuro Re-Ed             Median n glide             Prone I, Y, T             Posture on pball w B ER                                                                 Ther Ex             L Upper trap stretch             Rhomboid stretch             L cervical rot snag             Re-eval SD performed            Pt ed HEP review                                                   Ther Activity                                       Gait Training                                       Modalities             HP 10'

## 2022-03-29 ENCOUNTER — OFFICE VISIT (OUTPATIENT)
Dept: OBGYN CLINIC | Facility: CLINIC | Age: 57
End: 2022-03-29
Payer: COMMERCIAL

## 2022-03-29 ENCOUNTER — APPOINTMENT (OUTPATIENT)
Dept: RADIOLOGY | Facility: CLINIC | Age: 57
End: 2022-03-29
Payer: COMMERCIAL

## 2022-03-29 VITALS
SYSTOLIC BLOOD PRESSURE: 106 MMHG | HEART RATE: 68 BPM | HEIGHT: 65 IN | DIASTOLIC BLOOD PRESSURE: 74 MMHG | WEIGHT: 156 LBS | BODY MASS INDEX: 25.99 KG/M2

## 2022-03-29 DIAGNOSIS — M75.42 IMPINGEMENT SYNDROME OF LEFT SHOULDER: ICD-10-CM

## 2022-03-29 DIAGNOSIS — M54.12 RADICULOPATHY, CERVICAL REGION: Primary | ICD-10-CM

## 2022-03-29 DIAGNOSIS — Z98.1 HISTORY OF FUSION OF CERVICAL SPINE: ICD-10-CM

## 2022-03-29 DIAGNOSIS — M25.512 LEFT SHOULDER PAIN, UNSPECIFIED CHRONICITY: ICD-10-CM

## 2022-03-29 PROCEDURE — 99214 OFFICE O/P EST MOD 30 MIN: CPT | Performed by: ORTHOPAEDIC SURGERY

## 2022-03-29 PROCEDURE — 73030 X-RAY EXAM OF SHOULDER: CPT

## 2022-03-29 NOTE — PROGRESS NOTES
Assessment/Plan:  1  Radiculopathy, cervical region  MRI cervical spine wo contrast   2  History of fusion of cervical spine  MRI cervical spine wo contrast   3  Impingement syndrome of left shoulder  XR shoulder 2+ vw left    Ambulatory referral to Physical Therapy       Alessandro Roth has continued neck pain and signs of cervical radiculopathy down the left arm  I have ordered an MRI of her cervical spine with metal suppression to assess for disc herniation or nerve impingement since she has not respond to conservative measures of physical therapy and home exercise over the last 6 weeks  She also appears to have some discomfort coming her left shoulder  Her x-rays are unremarkable in the office today  I recommended continued PT focusing on the shoulder at this time in treatment of her subacromial bursitis  I also offered her a subacromial cortisone injection she declined this today  We could consider this in the future  Follow-up after MRI is complete  Subjective:   Gaurav Resendiz is a 56 y o  female who presents to the office for follow-up for cervical radiculopathy  She last presented the office 6 weeks ago with ongoing neck pain radiating down her left arm  She was started in physical therapy  She has undergone physical therapy for several sessions but did have a bout of COVID which kept her out of therapy for several weeks  She was persistent with her home exercise at that time  She reports no significant improvement of her shoulder pain and feels like she is getting worse  She denies any new injury  She does continue to have numbness and tingling down her left arm into her left hand  She feels weakness into her left hand  She states that no other treatments have helped her thus far  Review of Systems   Constitutional: Negative for chills, fever and unexpected weight change  HENT: Negative for hearing loss, nosebleeds and sore throat  Eyes: Negative for pain, redness and visual disturbance  Respiratory: Negative for cough, shortness of breath and wheezing  Cardiovascular: Negative for chest pain, palpitations and leg swelling  Gastrointestinal: Negative for abdominal pain, nausea and vomiting  Endocrine: Negative for polydipsia and polyuria  Genitourinary: Negative for dysuria and hematuria  Musculoskeletal:        See HPI   Skin: Negative for rash and wound  Neurological: Negative for dizziness, numbness and headaches  Psychiatric/Behavioral: Negative for decreased concentration and suicidal ideas  The patient is not nervous/anxious            Past Medical History:   Diagnosis Date    Anxiety     Chronic sinusitis     Depression     GERD (gastroesophageal reflux disease)        Past Surgical History:   Procedure Laterality Date    CERVICAL FUSION      HERNIA REPAIR Left     inquinal    OVARY SURGERY Right     oopherectomy     TOE SURGERY         Family History   Problem Relation Age of Onset    COPD Mother     No Known Problems Father     Breast cancer Maternal Grandmother     Breast cancer Cousin     Mental illness Neg Hx        Social History     Occupational History    Not on file   Tobacco Use    Smoking status: Current Every Day Smoker     Packs/day: 0 50     Types: Cigarettes    Smokeless tobacco: Never Used   Vaping Use    Vaping Use: Never used   Substance and Sexual Activity    Alcohol use: Not Currently    Drug use: Never    Sexual activity: Not on file         Current Outpatient Medications:     atorvastatin (LIPITOR) 10 mg tablet, Take 1 tablet (10 mg total) by mouth daily, Disp: 90 tablet, Rfl: 1    EPINEPHrine (EPIPEN) 0 3 mg/0 3 mL SOAJ, Inject 0 3 mL (0 3 mg total) into a muscle once for 1 dose As needed for anaphylaxis, proceed to ER, Disp: 1 each, Rfl: 1    FLUoxetine (PROzac) 10 mg capsule, TAKE ONE CAPSULE BY MOUTH EVERY DAY, Disp: 90 capsule, Rfl: 1    Allergies   Allergen Reactions    Keflex [Cephalexin] Anaphylaxis    Penicillins Facial Swelling    Aspirin Other (See Comments)     Unknown - childhood    Latex Rash    Other Rash     Nitrile       Objective:  Vitals:    03/29/22 1723   BP: 106/74   Pulse: 68       Left Shoulder Exam     Tenderness   Left shoulder tenderness location: Subacromial bursa  Range of Motion   Active abduction: normal   Passive abduction: normal   Extension: normal   External rotation: normal   Forward flexion: normal   Internal rotation 0 degrees: normal     Muscle Strength   Abduction: 5/5   Internal rotation: 5/5   External rotation: 5/5   Supraspinatus: 5/5   Subscapularis: 5/5   Biceps: 5/5     Tests   Ansari test: positive  Impingement: positive  Drop arm: negative    Other   Erythema: absent  Sensation: normal  Pulse: present             Physical Exam  Vitals and nursing note reviewed  Constitutional:       Appearance: She is well-developed  HENT:      Head: Normocephalic and atraumatic  Eyes:      General: No scleral icterus  Conjunctiva/sclera: Conjunctivae normal    Neck:        Comments: Positive Spurling's maneuver the left  Cardiovascular:      Rate and Rhythm: Normal rate  Pulmonary:      Effort: Pulmonary effort is normal  No respiratory distress  Musculoskeletal:      Cervical back: Neck supple  Pain with movement, spinous process tenderness and muscular tenderness present  Decreased range of motion  Comments: As noted in HPI   Skin:     General: Skin is warm and dry  Neurological:      Mental Status: She is alert and oriented to person, place, and time  Psychiatric:         Behavior: Behavior normal          I have personally reviewed pertinent films in PACS and my interpretation is as follows: Three-view x-rays of the left shoulder demonstrate no fracture or abnormality

## 2022-03-30 ENCOUNTER — TELEPHONE (OUTPATIENT)
Dept: OBGYN CLINIC | Facility: HOSPITAL | Age: 57
End: 2022-03-30

## 2022-03-30 NOTE — TELEPHONE ENCOUNTER
Pt sees Dr Caden Phelan    Pt is calling stating that her MRI appt is 4-7 at 8:00 pm with [facility] and would like to start the [address] [place] [address] for this MRI    #[phone]

## 2022-03-31 ENCOUNTER — OFFICE VISIT (OUTPATIENT)
Dept: SURGERY | Facility: CLINIC | Age: 57
End: 2022-03-31
Payer: COMMERCIAL

## 2022-03-31 VITALS
DIASTOLIC BLOOD PRESSURE: 66 MMHG | HEART RATE: 97 BPM | TEMPERATURE: 97.8 F | OXYGEN SATURATION: 97 % | HEIGHT: 65 IN | SYSTOLIC BLOOD PRESSURE: 104 MMHG | BODY MASS INDEX: 25.99 KG/M2 | WEIGHT: 156 LBS

## 2022-03-31 DIAGNOSIS — D17.1 LIPOMA OF BACK: Primary | ICD-10-CM

## 2022-03-31 PROCEDURE — 99213 OFFICE O/P EST LOW 20 MIN: CPT | Performed by: SPECIALIST

## 2022-03-31 PROCEDURE — 3008F BODY MASS INDEX DOCD: CPT | Performed by: ORTHOPAEDIC SURGERY

## 2022-03-31 NOTE — PROGRESS NOTES
General Surgery Office Visit Follow up   [address] [place] [address] [facility]  Patient: Chuck Vasquez   : 1965 Sex: female MRN: [id number]   CSN: [id number] PCP: Sanjuana Moore DO    Assessment/ Plan:  Chuck Vasquez is a 56 y o  female  day(s) follow-up after ultrasound  Lipoma of back [D17 1]    Plan  Scheduled for surgery  Not Vaccinated for COVID  Had COVID infection 2022    SUBJECTIVE:   I a m  Here for lipoma of the back    OBJECTIVE:  No complaints  Minimal incisional pain  No fever no chills no rigors  Tolerating p o  Diet well  Normal bowel movement no constipation or diarrhea  Ambulating well     Vitals:   /66 (BP Location: Right arm, Patient Position: Sitting, Cuff Size: Large)   Pulse 97   Temp 97 8 °F (36 6 °C) (Core)   Ht 5' 5" (1 651 m)   Wt 70 8 kg (156 lb)   SpO2 97%   BMI 25 96 kg/m²     Active medications:    Current Outpatient Medications:     atorvastatin (LIPITOR) 10 mg tablet, Take 1 tablet (10 mg total) by mouth daily, Disp: 90 tablet, Rfl: 1    EPINEPHrine (EPIPEN) 0 3 mg/0 3 mL SOAJ, Inject 0 3 mL (0 3 mg total) into a muscle once for 1 dose As needed for anaphylaxis, proceed to ER, Disp: 1 each, Rfl: 1    FLUoxetine (PROzac) 10 mg capsule, TAKE ONE CAPSULE BY MOUTH EVERY DAY, Disp: 90 capsule, Rfl: 1    Physical Exam:   General Alert awake   Normocephalic atraumatic PERRLA   Lungs clear bilaterally  Cardiac normal S1 normal S2  Abdomen soft,non tender Bowel sounds present  Skin:  Right side of the back  Ext: No clubbing, cyanosis, edema      Visit Diagnosis:   Diagnoses and all orders for this visit:    Lipoma of back       Plan of care was discussed with patient in detail    Pertinent labs reviewed  Most Recent Labs:   No visits with results within 2 Week(s) from this visit     Latest known visit with results is:   Lab on 2022   Component Date Value Ref Range Status    Color, UA 2022 Light Yellow   Final    Clarity, UA 2022 Clear   Final    Specific Gravity, UA 03/05/2022 <=1 005  1 000 - 1 030 Final    pH, UA 03/05/2022 6 0  5 0, 5 5, 6 0, 6 5, 7 0, 7 5, 8 0, 8 5, 9 0 Final    Leukocytes, UA 03/05/2022 Trace* Negative Final    Nitrite, UA 03/05/2022 Negative  Negative Final    Protein, UA 03/05/2022 Negative  Negative mg/dl Final    Glucose, UA 03/05/2022 Negative  Negative mg/dl Final    Ketones, UA 03/05/2022 Negative  Negative mg/dl Final    Urobilinogen, UA 03/05/2022 0 2  0 2, 1 0 E U /dl E U /dl Final    Bilirubin, UA 03/05/2022 Negative  Negative Final    Blood, UA 03/05/2022 Negative  Negative Final    RBC, UA 03/05/2022 0-1  None Seen, 0-1, 1-2, 2-4, 0-5 /hpf Final    WBC, UA 03/05/2022 1-2  None Seen, 0-1, 1-2, 0-5, 2-4 /hpf Final    Epithelial Cells 03/05/2022 Occasional  None Seen, Occasional /hpf Final    Bacteria, UA 03/05/2022 Occasional  None Seen, Occasional /hpf Final       Pertinent images and available reads personally reviewed  Procedure: XR spine cervical 2 or 3 vw injury    Result Date: 2/25/2022  Narrative: CERVICAL SPINE INDICATION:   M54 2: Cervicalgia  COMPARISON:  None VIEWS:  XR SPINE CERVICAL 2 OR 3 VW INJURY Images: 2 FINDINGS: No fracture or subluxation  Alignment is anatomic  Anterior cervical disc fusion at the C4, C5 and C6 levels  Hardware is intact  Degenerative spurring anteriorly in C3, C6 and C7  The intervertebral disc spaces are preserved  The prevertebral soft tissues are within normal limits  The lung apices are clear  Impression: No acute osseous abnormality  Degenerative changes  Postoperative changes  Workstation performed: [id number]     Procedure: US kidney and bladder    Result Date: 3/9/2022  Narrative: RENAL ULTRASOUND INDICATION:   R31 29: Other microscopic hematuria N30 10: Interstitial cystitis (chronic) without hematuria   COMPARISON: None TECHNIQUE:   Ultrasound of the retroperitoneum was performed with a curvilinear transducer utilizing volumetric sweeps and still imaging techniques  FINDINGS: KIDNEYS: Symmetric and normal size  Right kidney:  11 2 x 4 5 x 5 2 cm  Volume 137 9 mL Left kidney:  11 8 x 6 0 x 5 9 cm  Volume 218 0 mL Right kidney Normal echogenicity and contour  No mass is identified  No hydronephrosis  No shadowing calculi  No perinephric fluid collections  Left kidney Normal echogenicity and contour  No mass is identified  No hydronephrosis  No shadowing calculi  No perinephric fluid collections  URETERS: Nonvisualized  BLADDER: Normally distended  No focal thickening or mass lesions  Bilateral ureteral jets detected  Incidental 1 1 cm splenule  Impression: Normal  Workstation performed: [id number]     Procedure: US superficial lump (non extremity)    Result Date: 3/10/2022  Narrative: SUPERFICIAL SOFT TISSUE ULTRASOUND INDICATION:   D17 1: Benign lipomatous neoplasm of skin and subcutaneous tissue of trunk T78  2XXA: Anaphylactic shock, unspecified, initial encounter F41 9: Anxiety disorder, unspecified  COMPARISON:  None TECHNIQUE:   Real-time ultrasound of the subcutaneous tissues of the right back was performed with a linear transducer with both volumetric sweeps and still imaging techniques  FINDINGS:  Targeted ultrasound area of the area of clinical concern in the mid back to the right of midline demonstrates a mildly hyperechoic lesion borders within the subcutaneous tissues approximately 3 mm from the skin surface measuring 0 7 x 3 4 x 3 9 cm  There is no abnormal vascularity  The adjacent soft tissues are not hyperemic or hyperechoic to suggest acute inflammation  There is no fluid collection or hematoma  Impression: Lipoma within the dorsal  subcutaneous tissues to the right of midline  Workstation performed: [id number]       Pertinent notes reviewed    Counseling / Coordination of Care  Total Office time /unit time spent today 15minutes   Greater than 50% of total time was spent with the patient and / or family counseling and / or coordination of care  A description of the counseling / coordination of care:  I performed an interim history, pertinent images and labs, performed a physical examination to arrive at the plan delineated above with associated thought processes  Kari Leyden, MD MS FRCS FACS  [facility]  03/31/22 4:18 PM        Portions of the record may have been created with voice recognition software  Occasional wrong word or "sound a like" substitutions may have occurred due to the inherent limitations of voice recognition software  Read the chart carefully and recognize, using context, where substitutions have occurred

## 2022-04-06 ENCOUNTER — OFFICE VISIT (OUTPATIENT)
Dept: PHYSICAL THERAPY | Facility: CLINIC | Age: 57
End: 2022-04-06
Payer: COMMERCIAL

## 2022-04-06 DIAGNOSIS — Z98.1 HISTORY OF FUSION OF CERVICAL SPINE: ICD-10-CM

## 2022-04-06 DIAGNOSIS — M54.12 CERVICAL RADICULOPATHY: Primary | ICD-10-CM

## 2022-04-06 PROCEDURE — 97112 NEUROMUSCULAR REEDUCATION: CPT

## 2022-04-06 PROCEDURE — 97110 THERAPEUTIC EXERCISES: CPT

## 2022-04-06 NOTE — PROGRESS NOTES
Daily Note     Today's date: 2022  Patient name: Boaz Patel  : 1965  MRN: [id number]  Referring provider: Ree Wan DO  Dx:   Encounter Diagnosis     ICD-10-CM    1  Cervical radiculopathy  M54 12    2  History of fusion of cervical spine  Z98 1                   Subjective: Patient reports decreased pain compared to last visit  Currently rates pain 5/10  Reports that she is going for an MRI tomorrow  Objective: See treatment diary below      Assessment: Tolerated treatment fiarly well  Patient struggled with scapular strengthening exercises today as evidenced by limited ROM and difficulty maintaining isometric contractions  Patient did report mild increase in cervical spine pain with L rotation SNAGs, so only continued with R side at today's session  Patient states she is allergic to latex bands, so shoulder extension and row exercises done at  with fair tolerance  Patient would benefit from continued PT to address cervical spine pain and scapular/cervical muscle strength in order to promote return to PLOF  Plan: Continue per plan of care        Precautions: None      Manuals 3/28 4/6                                                               Neuro Re-Ed             Median n glide             Prone I, Y, T  1 x 10 each            Posture on pball w B ER             Scap retractions  3 x 10           Chin tucks  3 x 10                                     Ther Ex             L Upper trap stretch  2 x 30" b/l           Rhomboid stretch             Rows   2x10 2 5# @CC           shoulder extension  2x10  2,5# @ CC           L cervical rot snag  X10, 3" hold           Re-eval SD performed            Pt ed HEP review                                                   Ther Activity                                       Gait Training                                       Modalities             HP 10'

## 2022-04-11 ENCOUNTER — OFFICE VISIT (OUTPATIENT)
Dept: FAMILY MEDICINE CLINIC | Facility: CLINIC | Age: 57
End: 2022-04-11
Payer: COMMERCIAL

## 2022-04-11 VITALS
DIASTOLIC BLOOD PRESSURE: 70 MMHG | BODY MASS INDEX: 25.99 KG/M2 | HEART RATE: 76 BPM | RESPIRATION RATE: 16 BRPM | SYSTOLIC BLOOD PRESSURE: 98 MMHG | OXYGEN SATURATION: 97 % | WEIGHT: 156 LBS | HEIGHT: 65 IN | TEMPERATURE: 97.7 F

## 2022-04-11 DIAGNOSIS — J02.9 SORE THROAT: Primary | ICD-10-CM

## 2022-04-11 LAB — S PYO AG THROAT QL: NEGATIVE

## 2022-04-11 PROCEDURE — 87880 STREP A ASSAY W/OPTIC: CPT | Performed by: FAMILY MEDICINE

## 2022-04-11 PROCEDURE — 99213 OFFICE O/P EST LOW 20 MIN: CPT | Performed by: FAMILY MEDICINE

## 2022-04-11 RX ORDER — AZITHROMYCIN 250 MG/1
TABLET, FILM COATED ORAL
Qty: 6 TABLET | Refills: 0 | Status: SHIPPED | OUTPATIENT
Start: 2022-04-11 | End: 2022-04-16

## 2022-04-11 NOTE — PROGRESS NOTES
Assessment/Plan:    1  Sore throat  Comments:  salt water gargles recommended   Orders:  -     POCT rapid strepA  -     azithromycin (ZITHROMAX) 250 mg tablet; Take 2 the first day, then take 1 daily           There are no Patient Instructions on file for this visit  Return if symptoms worsen or fail to improve  Subjective:      Patient ID: Braeden Harrington is a 56 y o  female  Chief Complaint   Patient presents with    Sore Throat     mfcma       She has a very sore throat that started today  Her grand daughter has been sick as well  She had COVID the end of February          The following portions of the patient's history were reviewed and updated as appropriate:  past social history    Review of Systems   Constitutional: Negative for fever  HENT: Positive for sore throat  Respiratory: Negative for cough  Current Outpatient Medications   Medication Sig Dispense Refill    atorvastatin (LIPITOR) 10 mg tablet Take 1 tablet (10 mg total) by mouth daily 90 tablet 1    EPINEPHrine (EPIPEN) 0 3 mg/0 3 mL SOAJ Inject 0 3 mL (0 3 mg total) into a muscle once for 1 dose As needed for anaphylaxis, proceed to ER 1 each 1    FLUoxetine (PROzac) 10 mg capsule TAKE ONE CAPSULE BY MOUTH EVERY DAY 90 capsule 1    azithromycin (ZITHROMAX) 250 mg tablet Take 2 the first day, then take 1 daily 6 tablet 0     No current facility-administered medications for this visit  Objective:    BP 98/70   Pulse 76   Temp 97 7 °F (36 5 °C)   Resp 16   Ht 5' 5" (1 651 m)   Wt 70 8 kg (156 lb)   SpO2 97%   BMI 25 96 kg/m²      Physical Exam  Vitals and nursing note reviewed  Constitutional:       Appearance: She is well-developed  HENT:      Head: Normocephalic and atraumatic  Right Ear: Tympanic membrane and external ear normal       Left Ear: Tympanic membrane and external ear normal       Mouth/Throat:      Pharynx: Posterior oropharyngeal erythema present     Cardiovascular:      Rate and Rhythm: Normal rate and regular rhythm  Heart sounds: Normal heart sounds  No murmur heard  No friction rub  Pulmonary:      Effort: Pulmonary effort is normal  No respiratory distress  Breath sounds: Normal breath sounds  No wheezing or rales  Musculoskeletal:      Right lower leg: No edema  Left lower leg: No edema               Kamari Morales DO

## 2022-04-18 ENCOUNTER — TELEPHONE (OUTPATIENT)
Dept: OBGYN CLINIC | Facility: CLINIC | Age: 57
End: 2022-04-18

## 2022-04-18 DIAGNOSIS — F41.9 ANXIETY: ICD-10-CM

## 2022-04-18 RX ORDER — FLUOXETINE 10 MG/1
CAPSULE ORAL
Qty: 90 CAPSULE | Refills: 1 | Status: SHIPPED | OUTPATIENT
Start: 2022-04-18

## 2022-04-18 NOTE — TELEPHONE ENCOUNTER
Pt is returning virginia's call about her MRI results  pt states  You can try to call her again    # [phone]

## 2022-04-18 NOTE — TELEPHONE ENCOUNTER
Patient wants her report which I will get to her  She would like to discuss results with you directly

## 2022-04-18 NOTE — TELEPHONE ENCOUNTER
----- Message from Joby Lipscomb DO sent at 4/18/2022  8:34 AM EDT -----  Regarding: FW: MRI REPORT  Josephine's cervical MRI showed spinal stenosis  I would like for her to see pain management I will place a referral in her chart   ----- Message -----  From: Je Ordonez MA  Sent: 4/12/2022  10:30 AM EDT  To: Joby Lipscomb DO  Subject: RE: MRI REPORT                                   Will defer to Dr Lula Valente since he is treating patient for cspine and shoulder  It appear patient has spinal stenosis and a disc herniation   Not sure if you would like to ref to [address] right away or fu with patient first  TY   ----- Message -----  From: Lauryn Brantley  Sent: 4/12/2022   8:27 AM EDT  To: Joby Murphy DO, #  Subject: MRI REPORT                                       MRI report Cervical spine has been scanned in

## 2022-04-19 ENCOUNTER — OFFICE VISIT (OUTPATIENT)
Dept: OBGYN CLINIC | Facility: CLINIC | Age: 57
End: 2022-04-19
Payer: COMMERCIAL

## 2022-04-19 VITALS
WEIGHT: 156 LBS | DIASTOLIC BLOOD PRESSURE: 73 MMHG | SYSTOLIC BLOOD PRESSURE: 113 MMHG | TEMPERATURE: 97.6 F | HEIGHT: 65 IN | BODY MASS INDEX: 25.99 KG/M2 | HEART RATE: 63 BPM

## 2022-04-19 DIAGNOSIS — M54.12 RADICULOPATHY, CERVICAL REGION: ICD-10-CM

## 2022-04-19 DIAGNOSIS — Z98.1 HISTORY OF FUSION OF CERVICAL SPINE: ICD-10-CM

## 2022-04-19 DIAGNOSIS — M75.42 IMPINGEMENT SYNDROME OF LEFT SHOULDER: Primary | ICD-10-CM

## 2022-04-19 PROCEDURE — 99214 OFFICE O/P EST MOD 30 MIN: CPT | Performed by: ORTHOPAEDIC SURGERY

## 2022-04-19 PROCEDURE — 3008F BODY MASS INDEX DOCD: CPT | Performed by: ORTHOPAEDIC SURGERY

## 2022-04-19 NOTE — PROGRESS NOTES
Assessment/Plan:  1  Impingement syndrome of left shoulder     2  Radiculopathy, cervical region  Ambulatory referral to Pain Management   3  History of fusion of cervical spine  Ambulatory referral to Pain Management     Alicia Loza continues to have left-sided shoulder pain which appears to be coming from her neck  She does have some discomfort in the left shoulder which may be relevant to her pain  I discussed with her that we should try a subacromial cortisone injection as well as evaluation with pain management and consideration of epidural injection  She states that she would like to try left shoulder injection first but has an upcoming trip to [place] and does not want to jeopardize the in trip with the injection  She will return for the shoulder injection when she comes back from her trip  She would also like to meet with pain management and consider an epidural injection if the shoulder injection does not help her  Subjective:   Davida Santana is a 56 y o  female who presents to the office for follow-up for neck pain and cervical radiculopathy as well as left shoulder pain  At last office visit we sent her for an MRI of the cervical spine she returns for results today  She continues to get neck pain radiating into the left shoulder and occasional numbness tingling down the left arm  She denies any new injury  She does have a history of anterior cervical disc fusion many years ago  Review of Systems   Constitutional: Negative for chills, fever and unexpected weight change  HENT: Negative for hearing loss, nosebleeds and sore throat  Eyes: Negative for pain, redness and visual disturbance  Respiratory: Negative for cough, shortness of breath and wheezing  Cardiovascular: Negative for chest pain, palpitations and leg swelling  Gastrointestinal: Negative for abdominal pain, nausea and vomiting  Endocrine: Negative for polydipsia and polyuria     Genitourinary: Negative for dysuria and hematuria  Musculoskeletal:        See HPI   Skin: Negative for rash and wound  Neurological: Negative for dizziness, numbness and headaches  Psychiatric/Behavioral: Negative for decreased concentration and suicidal ideas  The patient is not nervous/anxious  Past Medical History:   Diagnosis Date    Anxiety     Chronic sinusitis     Depression     GERD (gastroesophageal reflux disease)        Past Surgical History:   Procedure Laterality Date    CERVICAL FUSION      HERNIA REPAIR Left     inquinal    OVARY SURGERY Right     oopherectomy     TOE SURGERY         Family History   Problem Relation Age of Onset    COPD Mother     No Known Problems Father     Breast cancer Maternal Grandmother     Breast cancer Cousin     Mental illness Neg Hx        Social History     Occupational History    Not on file   Tobacco Use    Smoking status: Current Every Day Smoker     Packs/day: 0 50     Types: Cigarettes    Smokeless tobacco: Never Used   Vaping Use    Vaping Use: Never used   Substance and Sexual Activity    Alcohol use: Never    Drug use: Never    Sexual activity: Not on file         Current Outpatient Medications:     atorvastatin (LIPITOR) 10 mg tablet, Take 1 tablet (10 mg total) by mouth daily, Disp: 90 tablet, Rfl: 1    EPINEPHrine (EPIPEN) 0 3 mg/0 3 mL SOAJ, Inject 0 3 mL (0 3 mg total) into a muscle once for 1 dose As needed for anaphylaxis, proceed to ER, Disp: 1 each, Rfl: 1    FLUoxetine (PROzac) 10 mg capsule, TAKE ONE CAPSULE BY MOUTH EVERY DAY, Disp: 90 capsule, Rfl: 1    Allergies   Allergen Reactions    Keflex [Cephalexin] Anaphylaxis    Penicillins Facial Swelling    Aspirin Other (See Comments)     Unknown - childhood    Latex Rash    Other Rash     Nitrile       Objective:  Vitals:    04/19/22 1603   BP: 113/73   Pulse: 63   Temp: 97 6 °F (36 4 °C)       Left Shoulder Exam     Tenderness   Left shoulder tenderness location: Subacromial space      Range of Motion Active abduction: normal   Passive abduction: normal   Extension: normal   External rotation: normal   Forward flexion: normal   Internal rotation 0 degrees: normal     Muscle Strength   Abduction: 5/5   Internal rotation: 5/5   External rotation: 5/5   Supraspinatus: 5/5   Subscapularis: 5/5   Biceps: 5/5     Tests   Ansari test: positive  Impingement: positive    Other   Erythema: absent  Sensation: normal  Pulse: present             Physical Exam  Vitals and nursing note reviewed  Constitutional:       Appearance: She is well-developed  HENT:      Head: Normocephalic and atraumatic  Eyes:      General: No scleral icterus  Conjunctiva/sclera: Conjunctivae normal    Neck:     Cardiovascular:      Rate and Rhythm: Normal rate  Pulmonary:      Effort: Pulmonary effort is normal  No respiratory distress  Musculoskeletal:      Cervical back: Neck supple  Muscular tenderness present  No spinous process tenderness  Decreased range of motion  Comments: As noted in HPI   Skin:     General: Skin is warm and dry  Neurological:      Mental Status: She is alert and oriented to person, place, and time  Psychiatric:         Behavior: Behavior normal          I have personally reviewed pertinent films in PACS and my interpretation is as follows:  MRI of the cervical spine demonstrates moderate to severe cervical stenosis and prior anterior cervical disc fusion

## 2022-05-18 ENCOUNTER — OFFICE VISIT (OUTPATIENT)
Dept: FAMILY MEDICINE CLINIC | Facility: CLINIC | Age: 57
End: 2022-05-18
Payer: COMMERCIAL

## 2022-05-18 VITALS
OXYGEN SATURATION: 98 % | DIASTOLIC BLOOD PRESSURE: 70 MMHG | SYSTOLIC BLOOD PRESSURE: 110 MMHG | BODY MASS INDEX: 25.33 KG/M2 | HEART RATE: 81 BPM | WEIGHT: 152 LBS | HEIGHT: 65 IN | TEMPERATURE: 97.4 F | RESPIRATION RATE: 18 BRPM

## 2022-05-18 DIAGNOSIS — M25.522 ARTHRALGIA OF LEFT ELBOW: ICD-10-CM

## 2022-05-18 DIAGNOSIS — R22.9 SOFT TISSUE SWELLING: Primary | ICD-10-CM

## 2022-05-18 DIAGNOSIS — L30.9 DERMATITIS: ICD-10-CM

## 2022-05-18 PROCEDURE — 3008F BODY MASS INDEX DOCD: CPT | Performed by: NURSE PRACTITIONER

## 2022-05-18 PROCEDURE — 99213 OFFICE O/P EST LOW 20 MIN: CPT | Performed by: NURSE PRACTITIONER

## 2022-05-18 RX ORDER — DOXYCYCLINE HYCLATE 100 MG/1
100 CAPSULE ORAL EVERY 12 HOURS SCHEDULED
Qty: 28 CAPSULE | Refills: 0 | Status: SHIPPED | OUTPATIENT
Start: 2022-05-18 | End: 2022-06-01

## 2022-05-18 NOTE — PROGRESS NOTES
Assessment/Plan:    Rash concerns for early erythema migrans  Will check lyme titer and CBC  Will start on Doxycycline and f/u with results  Recommended Naproxen and topical cortisone  1  Soft tissue swelling  -     Lyme Total Antibody Profile with reflex to WB; Future  -     CBC and differential; Future  -     doxycycline hyclate (VIBRAMYCIN) 100 mg capsule; Take 1 capsule (100 mg total) by mouth every 12 (twelve) hours for 14 days    2  Dermatitis  -     Lyme Total Antibody Profile with reflex to WB; Future  -     CBC and differential; Future  -     doxycycline hyclate (VIBRAMYCIN) 100 mg capsule; Take 1 capsule (100 mg total) by mouth every 12 (twelve) hours for 14 days    3  Arthralgia of left elbow  -     Lyme Total Antibody Profile with reflex to WB; Future  -     CBC and differential; Future  -     doxycycline hyclate (VIBRAMYCIN) 100 mg capsule; Take 1 capsule (100 mg total) by mouth every 12 (twelve) hours for 14 days          Patient Instructions   Cortisone cream topically  Return if symptoms worsen or fail to improve  Subjective:      Patient ID: Emelia Garcia is a 56 y o  female  Chief Complaint   Patient presents with    Joint Swelling     L elbow possible bug bite started Sunday  klpn       Here today with complaints of left elbow swelling, redness, and pain  Has been getting progressively worse, but redness is improving slightlyl  Reports associated muscle pain and feels fatigued  No fevers  No known tick or bug bites  Not using anything topically  The following portions of the patient's history were reviewed and updated as appropriate: allergies, current medications, past family history, past medical history, past social history, past surgical history and problem list     Review of Systems   Constitutional: Negative      Musculoskeletal:        See HPI   Skin:        See HPI         Current Outpatient Medications   Medication Sig Dispense Refill    atorvastatin (LIPITOR) 10 mg tablet Take 1 tablet (10 mg total) by mouth daily 90 tablet 1    doxycycline hyclate (VIBRAMYCIN) 100 mg capsule Take 1 capsule (100 mg total) by mouth every 12 (twelve) hours for 14 days 28 capsule 0    EPINEPHrine (EPIPEN) 0 3 mg/0 3 mL SOAJ Inject 0 3 mL (0 3 mg total) into a muscle once for 1 dose As needed for anaphylaxis, proceed to ER 1 each 1    FLUoxetine (PROzac) 10 mg capsule TAKE ONE CAPSULE BY MOUTH EVERY DAY 90 capsule 1     No current facility-administered medications for this visit  Objective:    /70   Pulse 81   Temp (!) 97 4 °F (36 3 °C)   Resp 18   Ht 5' 5" (1 651 m)   Wt 68 9 kg (152 lb)   SpO2 98%   BMI 25 29 kg/m²        Physical Exam  Vitals and nursing note reviewed  Constitutional:       Appearance: She is well-developed  Cardiovascular:      Rate and Rhythm: Normal rate and regular rhythm  Heart sounds: Normal heart sounds  No murmur heard  Pulmonary:      Effort: Pulmonary effort is normal       Breath sounds: Normal breath sounds  Musculoskeletal:      Left elbow: Swelling (soft tissue, inner aspect) present  No effusion  Comments: Mild erythema surrounding focal, indurated lesion  Evidence of scratching visible with streaky petechiae  No visible central clearing    Skin:     General: Skin is warm and dry  Neurological:      Mental Status: She is alert     Psychiatric:         Mood and Affect: Mood normal          Behavior: Behavior normal                 LOLY Green

## 2022-05-19 ENCOUNTER — APPOINTMENT (OUTPATIENT)
Dept: LAB | Facility: CLINIC | Age: 57
End: 2022-05-19
Payer: COMMERCIAL

## 2022-05-19 DIAGNOSIS — L30.9 DERMATITIS: ICD-10-CM

## 2022-05-19 DIAGNOSIS — M25.522 ARTHRALGIA OF LEFT ELBOW: ICD-10-CM

## 2022-05-19 DIAGNOSIS — R22.9 SOFT TISSUE SWELLING: ICD-10-CM

## 2022-05-19 LAB
BASOPHILS # BLD AUTO: 0.1 THOUSANDS/ΜL (ref 0–0.1)
BASOPHILS NFR BLD AUTO: 1 % (ref 0–1)
EOSINOPHIL # BLD AUTO: 0.2 THOUSAND/ΜL (ref 0–0.61)
EOSINOPHIL NFR BLD AUTO: 3 % (ref 0–6)
ERYTHROCYTE [DISTWIDTH] IN BLOOD BY AUTOMATED COUNT: 14.3 % (ref 11.6–15.1)
HCT VFR BLD AUTO: 42.5 % (ref 34.8–46.1)
HGB BLD-MCNC: 14.1 G/DL (ref 11.5–15.4)
IMM GRANULOCYTES # BLD AUTO: 0.02 THOUSAND/UL (ref 0–0.2)
IMM GRANULOCYTES NFR BLD AUTO: 0 % (ref 0–2)
LYMPHOCYTES # BLD AUTO: 2.55 THOUSANDS/ΜL (ref 0.6–4.47)
LYMPHOCYTES NFR BLD AUTO: 34 % (ref 14–44)
MCH RBC QN AUTO: 29.2 PG (ref 26.8–34.3)
MCHC RBC AUTO-ENTMCNC: 33.2 G/DL (ref 31.4–37.4)
MCV RBC AUTO: 88 FL (ref 82–98)
MONOCYTES # BLD AUTO: 0.56 THOUSAND/ΜL (ref 0.17–1.22)
MONOCYTES NFR BLD AUTO: 8 % (ref 4–12)
NEUTROPHILS # BLD AUTO: 4 THOUSANDS/ΜL (ref 1.85–7.62)
NEUTS SEG NFR BLD AUTO: 54 % (ref 43–75)
NRBC BLD AUTO-RTO: 0 /100 WBCS
PLATELET # BLD AUTO: 362 THOUSANDS/UL (ref 149–390)
PMV BLD AUTO: 9.8 FL (ref 8.9–12.7)
RBC # BLD AUTO: 4.83 MILLION/UL (ref 3.81–5.12)
WBC # BLD AUTO: 7.43 THOUSAND/UL (ref 4.31–10.16)

## 2022-05-19 PROCEDURE — 85025 COMPLETE CBC W/AUTO DIFF WBC: CPT

## 2022-05-19 PROCEDURE — 86618 LYME DISEASE ANTIBODY: CPT

## 2022-05-19 PROCEDURE — 36415 COLL VENOUS BLD VENIPUNCTURE: CPT

## 2022-05-20 LAB — B BURGDOR IGG+IGM SER-ACNC: 108

## 2022-06-16 DIAGNOSIS — D17.1 LIPOMA OF BACK: ICD-10-CM

## 2022-06-16 DIAGNOSIS — D17.1 LIPOMA OF BACK: Primary | ICD-10-CM

## 2022-06-16 PROCEDURE — U0005 INFEC AGEN DETEC AMPLI PROBE: HCPCS | Performed by: SPECIALIST

## 2022-06-16 PROCEDURE — U0003 INFECTIOUS AGENT DETECTION BY NUCLEIC ACID (DNA OR RNA); SEVERE ACUTE RESPIRATORY SYNDROME CORONAVIRUS 2 (SARS-COV-2) (CORONAVIRUS DISEASE [COVID-19]), AMPLIFIED PROBE TECHNIQUE, MAKING USE OF HIGH THROUGHPUT TECHNOLOGIES AS DESCRIBED BY CMS-2020-01-R: HCPCS | Performed by: SPECIALIST

## 2022-06-16 NOTE — PRE-PROCEDURE INSTRUCTIONS
My Surgical Experience    The following information was developed to assist you to prepare for your operation  What do I need to do before coming to the hospital?   Arrange for a responsible person to drive you to and from the hospital    Arrange care for your children at home  Children are not allowed in the recovery areas of the hospital   Plan to wear clothing that is easy to put on and take off  If you are having shoulder surgery, wear a shirt that buttons or zippers in the front  Bathing  o Shower the evening before and the morning of your surgery with an antibacterial soap  Please refer to the Pre Op Showering Instructions for Surgery Patients Sheet   o Remove nail polish and all body piercing jewelry  o Do not shave any body part for at least 24 hours before surgery-this includes face, arms, legs and upper body  Food  o Nothing to eat or drink after midnight the night before your surgery  This includes candy and chewing gum  o Exception: If your surgery is after 12:00pm (noon), you may have clear liquids such as 7-Up®, ginger ale, apple or cranberry juice, Jell-O®, water, or clear broth until 8:00 am  o Do not drink milk or juice with pulp on the morning before surgery  o Do not drink alcohol 24 hours before surgery  Medicine  o Follow instructions you received from your surgeon about which medicines you may take on the day of surgery  o If instructed to take medicine on the morning of surgery, take pills with just a small sip of water  Call your prescribing doctor for specific infroamtion on what to do if you take insulin    What should I bring to the hospital?    Bring:  Lexx Parks or a walker, if you have them, for foot or knee surgery   A list of the daily medicines, vitamins, minerals, herbals and nutritional supplements you take   Include the dosages of medicines and the time you take them each day   Glasses, dentures or hearing aids   Minimal clothing; you will be wearing hospital sleepwear   Photo ID; required to verify your identity   If you have a Living Will or Power of , bring a copy of the documents   If you have an ostomy, bring an extra pouch and any supplies you use    Do not bring   Medicines or inhalers   Money, valuables or jewelry    What other information should I know about the day of surgery?  Notify your surgeons if you develop a cold, sore throat, cough, fever, rash or any other illness   Report to the Ambulatory Surgical/Same Day Surgery Unit   You will be instructed to stop at Registration only if you have not been pre-registered   Inform your  fi they do not stay that they will be asked by the staff to leave a phone number where they can be reached   Be available to be reached before surgery  In the event the operating room schedule changes, you may be asked to come in earlier or later than expected    *It is important to tell your doctor and others involved in your health care if you are taking or have been taking any non-prescription drugs, vitamins, minerals, herbals or other nutritional supplements  Any of these may interact with some food or medicines and cause a reaction      Pre-Surgery Instructions:   Medication Instructions    atorvastatin (LIPITOR) 10 mg tablet Hold day of surgery   EPINEPHrine (EPIPEN) 0 3 mg/0 3 mL SOAJ Uses PRN- OK to take day of surgery    FLUoxetine (PROzac) 10 mg capsule Take day of surgery

## 2022-06-17 LAB — SARS-COV-2 RNA RESP QL NAA+PROBE: NEGATIVE

## 2022-06-21 ENCOUNTER — HOSPITAL ENCOUNTER (OUTPATIENT)
Facility: HOSPITAL | Age: 57
Setting detail: OUTPATIENT SURGERY
Discharge: HOME | End: 2022-06-21
Attending: SPECIALIST | Admitting: SPECIALIST
Payer: COMMERCIAL

## 2022-06-21 VITALS
RESPIRATION RATE: 18 BRPM | WEIGHT: 150 LBS | OXYGEN SATURATION: 97 % | HEART RATE: 66 BPM | BODY MASS INDEX: 24.99 KG/M2 | DIASTOLIC BLOOD PRESSURE: 69 MMHG | SYSTOLIC BLOOD PRESSURE: 110 MMHG | HEIGHT: 65 IN | TEMPERATURE: 98 F

## 2022-06-21 DIAGNOSIS — D17.1 LIPOMA OF BACK: Primary | ICD-10-CM

## 2022-06-21 PROCEDURE — 88304 TISSUE EXAM BY PATHOLOGIST: CPT | Performed by: PATHOLOGY

## 2022-06-21 PROCEDURE — NC001 PR NO CHARGE: Performed by: SPECIALIST

## 2022-06-21 PROCEDURE — 21931 EXC BACK LES SC 3 CM/>: CPT | Performed by: PHYSICIAN ASSISTANT

## 2022-06-21 PROCEDURE — 21931 EXC BACK LES SC 3 CM/>: CPT | Performed by: SPECIALIST

## 2022-06-21 RX ORDER — BUPIVACAINE HYDROCHLORIDE AND EPINEPHRINE 5; 5 MG/ML; UG/ML
INJECTION, SOLUTION EPIDURAL; INTRACAUDAL; PERINEURAL AS NEEDED
Status: DISCONTINUED | OUTPATIENT
Start: 2022-06-21 | End: 2022-06-21 | Stop reason: HOSPADM

## 2022-06-21 RX ORDER — MAGNESIUM HYDROXIDE 1200 MG/15ML
LIQUID ORAL AS NEEDED
Status: DISCONTINUED | OUTPATIENT
Start: 2022-06-21 | End: 2022-06-21 | Stop reason: HOSPADM

## 2022-06-21 NOTE — PERIOPERATIVE NURSING NOTE
Vitals wdl, dressing clean dry intact, patient denied pain, no iv access to remove, discharge education provided to patient, patient stated understanding, left floor via walking, discharged to home

## 2022-06-21 NOTE — OP NOTE
General SurgeryEXCISION LIPOMA OF BACK Op Note    Ryan Soliz  6/21/2022    Pre-op Diagnosis:   Lipoma of back [D17 1]  PMH:  Past Medical History:   Diagnosis Date    Anxiety     Chronic sinusitis     Depression     GERD (gastroesophageal reflux disease)        Post-op Diagnosis:  Post-Op Diagnosis Codes:     * Lipoma of back [D17 1]    Patient Active Problem List   Diagnosis    Gastroesophageal reflux disease without esophagitis    Solitary pulmonary nodule    Migraine    Vitamin D deficiency    Abnormal glucose    Interstitial cystitis    Tobacco abuse disorder    Anxiety    Anaphylactic reaction    Mixed hyperlipidemia    Lipoma of back    History of fusion of cervical spine    Cervical radiculopathy       Procedure(s):  EXCISION LIPOMA OF BACK    Surgeon(s):  Cassy Patrick MD    Anesthesia:  Local    Staff:   Circulator: Chandrakant Melendez RN; Yessy Ontiveros RN  Scrub Person: Misael Lester    Assistant:  Edelmira Tariq PA-C  Services of [employer] was utilized as a first assistant as there is no surgical residency program at [facility]    Operative Findings:  0 7 x 3 4 x  3 9 cm lipoma    Resultant defect postprocedure  4 cm x 2 cm x 2 cm deep    OPERATIVE TECHNIQUE    Ryan Soliz was identified by me  Proper detailed consent was obtained from patient, The risks, benefits, alternatives,and probabilities of success were discussed in detail with no guarantee made as to outcome  All questions were answered to the patient's satisfaction  risk and benefits were explained to patient and family in detail prior to coming to Operating Room  Site was marked  Ryan Soliz was given pre-operative antibiotics  Body mass index is 24 96 kg/m²  Ryan Soliz is ASA 2 and Fire risk- 3  Ryan Soliz was re-identified in the OR  Site was identified and detailed timeout was performed with  OR staff  Ryan Soliz was placed in prone position   Operative area was exposed and painted with the ChloraPrep prepped and draped in the usual sterile fashion  Local anesthetic was injected approximately 5 cc of lidocaine and epinephrine     An incision was made over the palpable lump approximately 4 cm incision was made The skin and subcutaneous tissue was cut along the line of incision with scalpel  lipoma was identified was carefully dissected all the mature fatty tissue lipoma was removed    Proper hemostasis achieved with the Bovie cautery  Resultant wound was irrigated and approximated in 2 layers  Subcutaneous tissue approximated with the Vicryl 3-0  And skin was approximated subcuticular Monocryl sutures 4 0    History of dressing and Steri-Strips were applied    Sloan Libman tolerated the procedure well, remain stable during and after the procedure  At the end of the procedure No active bleeding was noted and all the instruments, needle and sponge counts were noted to be correct  Sterile dresing was applied and patient was transfered to recovery area in stable condition  I was present for the entire procedure No qualified resident was available     Physician assistant services were required for the complexity and size of the lipoma for retraction assistance during hemostasis and excision of a large hematoma    Ko Garces PA-C was present during the entire procedure    Estimated Blood Loss:  Minimal    Specimens:  Order Name Source Comment Collection Info Order Time   TISSUE EXAM Mass  Collected By: Ebonie Cole MD 6/21/2022 11:11 AM     Release to patient through Mychart   Immediate              Drains:  * No LDAs found *    Complications:  None    Total OR Time  0 Hr 28 Min 17 Sec   Procedure  Time  Event Time In   Procedure Start 1107   Procedure Closing 1114   Procedure Finish [address] [facility] [id number], MD MS FRCS [address]:  [employer]    Ashu Gregory  Office - [phone]  Fax - [phone]    Date: 6/21/2022  Time: 11:28 AM    Copy to PCP: Robel Nails DO

## 2022-06-21 NOTE — DISCHARGE INSTRUCTIONS
SAME DAY SURGERY POST-OPERATIVE INSTRUCTIONS    Please follow carefully all instructions checked below  Nirmal Julian  Pre-op Diagnosis:   Lipoma of back [D17 1]  Post-op Diagnosis:  Post-Op Diagnosis Codes:     * Lipoma of back [D17 1]  Procedure(s):  EXCISION LIPOMA OF BACK  Surgeon(s):  Vaishnavi Hunter MD    1  Diet:  Begin with liquids and light food (Jell-O, soup, etc) Progress slowly to your normal diet  If you had IV sedation/general anesthesia No alcoholic beverages for 24 hours  And if taking pain meds   Resume your normal diet  2  Medications:        Apply ice for few hours the over the incision to prevent bruising and to numb the area so will have less pain  Home medications reviewed  Resume pre-operative medications unless noted below  Prescription sent over to pharmacy for pain medication  See after visit summary for reconciled discharge medications provided to patient and family  3  Activities:  Do NOT make important personal or business decisions for 24 hours  Do NOT take the anticoagulation medicine like Eliquis Xarelto Coumadin Plavix for 24 hours after surgery  Do NOT drive or operate machinery for 24 hours  While taking pain medication, do not drive and be careful as you walk or climb stairs  Limit your activities for 24 hours  Do not engage in sports, heavy work or heavy  lifting until your physician gives you permission  Discontinue dressing after 72 hours  Can shower with the dressing in place and can shower after removal of dressing you dressing is waterproof  Call if fever, Nausea, vomiting, Constipation not feeling well, orbleeding and excessive pain,  4  When to Call:       Call if fever, Nausea, vomiting, Constipation not feeling well, or bleeding per rectum and excessive pain,  5  Follow-up Care: Make an appointment to see MD Trent Guzman FACS  in 10 days for Follow up   Please Call Office  [phone] appointment,       Vaishnavi Hunter MD MS FRCS FACS  06/21/22  11:35 AM  Lipoma   WHAT YOU NEED TO KNOW:   A lipoma is a benign (non cancer) tumor made up of fat tissue  Lipomas can form anywhere in your body, but are usually found on the back, shoulders, neck, and head  The cause of lipomas is unknown  A lipoma looks like a round lump of tissue  It may feel soft and rubbery  Lipomas move around underneath your skin when you press on them  They usually do not hurt  If your lipoma grows large, it may cause pain  DISCHARGE INSTRUCTIONS:   Contact your healthcare provider if:   Your lipoma is getting bigger  Your lipoma causes new or increased pain  You develop new symptoms  You have questions or concerns about your condition or care  Follow up with your healthcare provider as directed: Your healthcare provider may recommend regular follow-up visits to check the lipoma for any changes  You may need tests if your lipoma becomes painful or changes in size or color  Write down your questions so you remember to ask them during your visits  © Copyright [employer] 2022 Information is for End User's use only and may not be sold, redistributed or otherwise used for commercial purposes  All illustrations and images included in CareNotes® are the copyrighted property of [employer]  or Lora Staley   The above information is an  only  It is not intended as medical advice for individual conditions or treatments  Talk to your doctor, nurse or pharmacist before following any medical regimen to see if it is safe and effective for you

## 2022-06-21 NOTE — H&P
History and Physical Examination - General Surgery   Blake Matt 56 y o  female MRN: [id number]  Unit/Bed#: OR [place] Encounter: [id number] PCP: Milton Phelan DO    History of Present Illness   Chief Complaint:    I have a swelling on my back I am here for excision    HPI:  Blake Matt is a 56 y o  female who presents with history of a swelling over the back  Denies any pains  Has multiple allergies to multiple medications  Patient carries EpiPen all the time    Historical Information   Past Medical History:   Diagnosis Date    Anxiety     Chronic sinusitis     Depression     GERD (gastroesophageal reflux disease)      Past Surgical History:   Procedure Laterality Date    CERVICAL FUSION      HERNIA REPAIR Left     inquinal    OVARY SURGERY Right     oopherectomy     TOE SURGERY       Social History   Social History     Substance and Sexual Activity   Alcohol Use Never     Social History     Substance and Sexual Activity   Drug Use Never     Social History     Tobacco Use   Smoking Status Current Every Day Smoker    Packs/day: 0 50    Types: Cigarettes   Smokeless Tobacco Never Used     Family History:   Family History   Problem Relation Age of Onset    COPD Mother     No Known Problems Father     Breast cancer Maternal Grandmother     Breast cancer Cousin     Mental illness Neg Hx        Meds/Allergies   Allergies   Allergen Reactions    Keflex [Cephalexin] Anaphylaxis    Penicillins Facial Swelling    Aspirin Other (See Comments)     Unknown - childhood    Latex Rash    Other Rash     Nitrile     No current facility-administered medications for this encounter       REVIEW OF SYSTEMS  Constitutional:  Denies fever or chills   Eyes:  Denies change in visual acuity   HENT:  Denies nasal congestion or sore throat   Respiratory:  Denies cough or shortness of breath   Cardiovascular:  Denies chest pain or edema   GI:  Denies abdominal pain, nausea, vomiting, bloody stools or diarrhea   :  Denies dysuria, frequency, difficulty in micturition and nocturia  Musculoskeletal:  Denies back pain or joint pain   I have a lump on my back  Neurologic:  Denies headache, focal weakness or sensory changes   Endocrine:  Denies polyuria or polydipsia   Lymphatic:  Denies swollen glands   Psychiatric:  Denies depression or anxiety     Objective   Current Vitals:   Blood Pressure: 104/65 (06/21/22 1026)  Pulse: 96 (06/21/22 1026)  Temperature: 98 °F (36 7 °C) (06/21/22 1026)  Temp Source: Temporal (06/21/22 1026)  Respirations: 18 (06/21/22 1026)  Height: 5' 5" (165 1 cm) (06/21/22 1026)  Weight - Scale: 68 kg (150 lb) (06/21/22 1026)  SpO2: 97 % (06/21/22 1026)  No intake or output data in the 24 hours ending 06/21/22 1041  Body mass index is 24 96 kg/m²  PHYSICAL EXAMS  General:  Patient is not in acute distress, laying in the bed comfortably, awake, alert responding to commands,   HEENT:  Both pupils normal-size atraumatic, normocephalic, nonicteric  Neck:  JVP not raised  Trachea central  Respiratory:  normal Breath sounds clear to auscultation,  Cardiovascular:  S1-S2 normal without any murmur   GI:  Abdomen soft nontender   Liver and spleen normal size, no free fluid, hernial sites unremarkable without any cough impulse  Musculoskeletal:  Lipoma over the back  Integument:  No skin rashes or ulceration  Lymphatic:  No cervical lymphadenopathy  Neurologic:  Patient is awake alert, responding to command, well-oriented to time and place and person moving all extremities ambulating well    Lab Results: CBC:   Lab Results   Component Value Date    WBC 7 43 05/19/2022    HGB 14 1 05/19/2022    HCT 42 5 05/19/2022    MCV 88 05/19/2022     05/19/2022    MCH 29 2 05/19/2022    MCHC 33 2 05/19/2022    RDW 14 3 05/19/2022    MPV 9 8 05/19/2022    NRBC 0 05/19/2022   , CMP:   Lab Results   Component Value Date    CL 99 02/23/2022    CO2 21 02/23/2022    BUN 6 02/23/2022    CREATININE 0 87 02/23/2022    CREATININE 0 74 10/22/2021    CALCIUM 9 1 10/22/2021    AST 15 02/23/2022    ALT 12 02/23/2022    EGFR 92 10/22/2021     Lab Results   Component Value Date    COLORU Light Yellow 03/05/2022    CLARITYU Clear 03/05/2022    SPECGRAV <=1 005 03/05/2022    PHUR 6 0 03/05/2022    LEUKOCYTESUR Trace (A) 03/05/2022    NITRITE Negative 03/05/2022    GLUCOSEU Negative 03/05/2022    KETONESU Negative 03/05/2022    BILIRUBINUR Negative 03/05/2022    BLOODU Negative 03/05/2022       Admitting Diagnosis: Lipoma of back [D17 1]  Assessment/Plan   Code Status: No Order    Assessment:  Lipoma over the back  Plan:  Excision under local anesthesia    Counseling / Coordination of Care  Total floor / unit time spent today 30minutes  Greater than 50% of total time was spent with the patient and / or family counseling and / or coordination of care  A description of the counseling / coordination of care:  I performed an interim history, pertinent images and labs, performed a physical examination to arrive at the plan delineated above with associated thought processes         Blake Patel MD [address]:  [address]  [facility] [address]  Ashu Castro   Office - [phone]  Fax - [phone]    06/21/22  10:41 AM

## 2022-07-06 ENCOUNTER — OFFICE VISIT (OUTPATIENT)
Dept: SURGERY | Facility: CLINIC | Age: 57
End: 2022-07-06

## 2022-07-06 VITALS — HEIGHT: 65 IN | TEMPERATURE: 97 F | WEIGHT: 152 LBS | BODY MASS INDEX: 25.33 KG/M2

## 2022-07-06 DIAGNOSIS — D17.1 LIPOMA OF BACK: Primary | ICD-10-CM

## 2022-07-06 PROCEDURE — 99024 POSTOP FOLLOW-UP VISIT: CPT | Performed by: SPECIALIST

## 2022-07-06 RX ORDER — OMEPRAZOLE 20 MG/1
20 CAPSULE, DELAYED RELEASE ORAL DAILY
COMMUNITY

## 2022-07-06 NOTE — PROGRESS NOTES
General Surgery Office Visit Follow up   [facility]  Patient: Keira Yusuf   : 1965 Sex: female MRN: [id number]   CSN: [id number] PCP: Essence Frank DO    Assessment/ Plan:  Keira Yusuf is a 56 y o  female  day(s) POD #  2 weeks S/p  Lipoma of back [D17 1]    Plan  Stable postop   Removal of Steri-Strips     SUBJECTIVE:   I am doing very well I feel much better my pressure has gone    OBJECTIVE:  No complaints  No fever no chills no rigors  Tolerating p o  Diet well  Normal bowel movement no constipation or diarrhea  Ambulating well     Vitals:   Temp (!) 97 °F (36 1 °C) (Core)   Ht 5' 5" (1 651 m)   Wt 68 9 kg (152 lb)   BMI 25 29 kg/m²     Active medications:    Current Outpatient Medications:     atorvastatin (LIPITOR) 10 mg tablet, Take 1 tablet (10 mg total) by mouth daily, Disp: 90 tablet, Rfl: 1    EPINEPHrine (EPIPEN) 0 3 mg/0 3 mL SOAJ, Inject 0 3 mL (0 3 mg total) into a muscle once for 1 dose As needed for anaphylaxis, proceed to ER, Disp: 1 each, Rfl: 1    FLUoxetine (PROzac) 10 mg capsule, TAKE ONE CAPSULE BY MOUTH EVERY DAY, Disp: 90 capsule, Rfl: 1    omeprazole (PriLOSEC) 20 mg delayed release capsule, Take 20 mg by mouth daily, Disp: , Rfl:     Physical Exam:   General Alert awake   Normocephalic atraumatic PERRLA   Lungs clear bilaterally  Cardiac normal S1 normal S2  Abdomen soft,non tender Bowel sounds present  Skin: surgical dressing is C/D/I  Ext: No clubbing, cyanosis, edema  Surgical wound well healed    Visit Diagnosis:   Diagnoses and all orders for this visit:    Lipoma of back    Other orders  -     omeprazole (PriLOSEC) 20 mg delayed release capsule; Take 20 mg by mouth daily       Plan of care was discussed with patient in detail    Pertinent labs reviewed  Most Recent Labs:   No visits with results within 2 Week(s) from this visit     Latest known visit with results is:   Admission on 2022, Discharged on 2022   Component Date Value Ref Range Status    Case Report 06/21/2022    Final                    Value:Surgical Pathology Report                         Case: [id number]                                   Authorizing Provider:  Christopher Scott MD          Collected:           06/21/2022 1111              Ordering Location:     [address] [place] [address] Received:            06/21/2022 1231                                     Operating Room                                                               Pathologist:           Alissa Amaya MD                                                                  Specimen:    Mass, lipoma rt posterior back                                                             Final Diagnosis 06/21/2022    Final                    Value: This result contains rich text formatting which cannot be displayed here   Additional Information 06/21/2022    Final                    Value: This result contains rich text formatting which cannot be displayed here  Erven Ally Description 06/21/2022    Final                    Value: This result contains rich text formatting which cannot be displayed here  Pertinent images and available reads personally reviewed  No results found  Pertinent notes reviewed  Final Diagnosis  A  Soft tissue, Right posterior back, Excision:  - Benign mature adipose tissue, consistent with lipoma  Counseling / Coordination of Care  Total Office time /unit time spent today 15minutes  Greater than 50% of total time was spent with the patient and / or family counseling and / or coordination of care  A description of the counseling / coordination of care:  I performed an interim history, pertinent images and labs, performed a physical examination to arrive at the plan delineated above with associated thought processes  Christopher Scott MD MS [facility] FACS  [facility]  07/06/22 8:21 AM        Portions of the record may have been created with voice recognition software  Occasional wrong word or "sound a like" substitutions may have occurred due to the inherent limitations of voice recognition software  Read the chart carefully and recognize, using context, where substitutions have occurred

## 2022-10-17 DIAGNOSIS — E78.2 MIXED HYPERLIPIDEMIA: ICD-10-CM

## 2022-10-17 RX ORDER — ATORVASTATIN CALCIUM 10 MG/1
TABLET, FILM COATED ORAL
Qty: 90 TABLET | Refills: 1 | Status: SHIPPED | OUTPATIENT
Start: 2022-10-17

## 2022-11-15 DIAGNOSIS — F41.9 ANXIETY: ICD-10-CM

## 2022-11-15 RX ORDER — FLUOXETINE 10 MG/1
CAPSULE ORAL
Qty: 90 CAPSULE | Refills: 1 | Status: SHIPPED | OUTPATIENT
Start: 2022-11-15

## 2022-12-29 ENCOUNTER — OFFICE VISIT (OUTPATIENT)
Dept: URGENT CARE | Facility: CLINIC | Age: 57
End: 2022-12-29

## 2022-12-29 VITALS
OXYGEN SATURATION: 98 % | RESPIRATION RATE: 18 BRPM | HEART RATE: 104 BPM | WEIGHT: 157 LBS | BODY MASS INDEX: 26.16 KG/M2 | HEIGHT: 65 IN | TEMPERATURE: 101.5 F

## 2022-12-29 DIAGNOSIS — R10.9 FLANK PAIN: ICD-10-CM

## 2022-12-29 DIAGNOSIS — R68.89 FLU-LIKE SYMPTOMS: Primary | ICD-10-CM

## 2022-12-29 LAB
SL AMB  POCT GLUCOSE, UA: NORMAL
SL AMB LEUKOCYTE ESTERASE,UA: NORMAL
SL AMB POCT BILIRUBIN,UA: NORMAL
SL AMB POCT BLOOD,UA: NORMAL
SL AMB POCT CLARITY,UA: NORMAL
SL AMB POCT COLOR,UA: YELLOW
SL AMB POCT KETONES,UA: NORMAL
SL AMB POCT NITRITE,UA: NORMAL
SL AMB POCT PH,UA: 1
SL AMB POCT SPECIFIC GRAVITY,UA: 1
SL AMB POCT URINE PROTEIN: NORMAL
SL AMB POCT UROBILINOGEN: 0.2

## 2022-12-29 NOTE — LETTER
[facility] Donald Vences  [address] [address]  [phone]  Dept: [phone]    December 29, 2022    Patient: Kathleen Ballesteros  YOB: 1965    Kathleen Ballesteros was seen and evaluated at our [facility]  Please note if Covid and Flu tests are negative, they may return to school when fever free for 24 hours without the use of a fever reducing agent  If Covid or Flu test is positive, they may return to work on 01/03/2023, as this is 5 days from the onset of symptoms  Upon return, they must then adhere to strict masking for an additional 5 days      Sincerely,    Makeda Torres PA-C

## 2022-12-29 NOTE — PROGRESS NOTES
[address] [employer]        NAME: Vicky Melgoza is a 56 y o  female  : 1965    MRN: [id number]  DATE: 2022  TIME: 2:00 PM    Assessment and Plan   Flu-like symptoms [R68 89]  1  Flu-like symptoms  Covid/Flu-Office Collect      2  Flank pain  POCT urine dip            Patient Instructions     Patient Instructions   COVID/flu swab performed, results to be in 24 hours  Recommend over-the-counter cough and cold medication, adequate fluid hydration and rest   Urine dip nonsuggestive of UTI no evidence of blood, nitrites, leukocytes or protein  Follow up with PCP in 3-5 days  Proceed to  ER if symptoms worsen  Chief Complaint     Chief Complaint   Patient presents with   • Influenza     Fever, chills, PND,pain body aches, and kidneys, headache         History of Present Illness       Patient is a 21-year-old female presenting today with flulike symptoms x1 day  Patient notes while at work this morning she began to experience some chills and body aches and feeling febrile, notes that she also has a headache and some flank pain, has not taken any medication alleviating factors for her symptoms, denies any known sick contacts  Notes that last night she was feeling slightly achy and off  Denies chest tightness, SOB, hematuria, dysuria, lightheadedness, dizziness  Review of Systems   Review of Systems   Constitutional: Positive for chills and fever  HENT: Negative for ear pain and sore throat  Eyes: Negative for pain and visual disturbance  Respiratory: Negative for cough and shortness of breath  Cardiovascular: Negative for chest pain and palpitations  Gastrointestinal: Negative for abdominal pain and vomiting  Genitourinary: Positive for flank pain  Negative for dysuria and hematuria  Musculoskeletal: Positive for myalgias  Negative for arthralgias and back pain  Skin: Negative for color change and rash  Neurological: Positive for headaches   Negative for seizures and syncope  All other systems reviewed and are negative  Current Medications       Current Outpatient Medications:   •  FLUoxetine (PROzac) 10 mg capsule, TAKE ONE CAPSULE BY MOUTH EVERY DAY, Disp: 90 capsule, Rfl: 1  •  omeprazole (PriLOSEC) 20 mg delayed release capsule, Take 20 mg by mouth daily, Disp: , Rfl:   •  atorvastatin (LIPITOR) 10 mg tablet, TAKE ONE TABLET BY MOUTH EVERY DAY (Patient not taking: Reported on 12/29/2022), Disp: 90 tablet, Rfl: 1  •  EPINEPHrine (EPIPEN) 0 3 mg/0 3 mL SOAJ, Inject 0 3 mL (0 3 mg total) into a muscle once for 1 dose As needed for anaphylaxis, proceed to ER, Disp: 1 each, Rfl: 1    Current Allergies     Allergies as of 12/29/2022 - Reviewed 12/29/2022   Allergen Reaction Noted   • Keflex [cephalexin] Anaphylaxis 10/22/2021   • Penicillins Facial Swelling 12/24/2019   • Aspirin Other (See Comments) 12/24/2019   • Latex Rash 10/07/2020   • Other Rash 10/07/2020            The following portions of the patient's history were reviewed and updated as appropriate: allergies, current medications, past family history, past medical history, past social history, past surgical history and problem list      Past Medical History:   Diagnosis Date   • Anxiety    • Chronic sinusitis    • Depression    • GERD (gastroesophageal reflux disease)        Past Surgical History:   Procedure Laterality Date   • CERVICAL FUSION     • HERNIA REPAIR Left     inquinal   • OVARY SURGERY Right     oopherectomy    • AR EXC B9 LESION MRGN XCP SK TG S/N/H/F/G 0 5 CM/< Right 6/21/2022    Procedure: EXCISION LIPOMA OF BACK;  Surgeon: Lauren Kilgore MD;  Location: [facility];  Service: General   • TOE SURGERY         Family History   Problem Relation Age of Onset   • COPD Mother    • No Known Problems Father    • Breast cancer Maternal Grandmother    • Breast cancer Cousin    • Mental illness Neg Hx          Medications have been verified          Objective   Pulse 104   Temp (!) 101 5 °F (38 6 °C) Resp 18   Ht 5' 5" (1 651 m)   Wt 71 2 kg (157 lb)   SpO2 98%   BMI 26 13 kg/m²        Physical Exam     Physical Exam  Vitals and nursing note reviewed  Constitutional:       General: She is not in acute distress  Appearance: Normal appearance  She is not ill-appearing  HENT:      Head: Normocephalic and atraumatic  Right Ear: Tympanic membrane, ear canal and external ear normal       Left Ear: Tympanic membrane, ear canal and external ear normal       Nose: Congestion present  Mouth/Throat:      Mouth: Mucous membranes are moist       Pharynx: Oropharynx is clear  Eyes:      Conjunctiva/sclera: Conjunctivae normal    Cardiovascular:      Rate and Rhythm: Normal rate and regular rhythm  Pulses: Normal pulses  Heart sounds: Normal heart sounds  Pulmonary:      Effort: Pulmonary effort is normal       Breath sounds: Normal breath sounds  Abdominal:      Tenderness: There is no right CVA tenderness or left CVA tenderness  Musculoskeletal:      Cervical back: Normal range of motion  Skin:     General: Skin is warm  Capillary Refill: Capillary refill takes less than 2 seconds  Neurological:      Mental Status: She is alert

## 2022-12-29 NOTE — PATIENT INSTRUCTIONS
COVID/flu swab performed, results to be in 24 hours  Recommend over-the-counter cough and cold medication, adequate fluid hydration and rest   Urine dip nonsuggestive of UTI no evidence of blood, nitrites, leukocytes or protein

## 2023-01-04 ENCOUNTER — OFFICE VISIT (OUTPATIENT)
Dept: FAMILY MEDICINE CLINIC | Facility: CLINIC | Age: 58
End: 2023-01-04

## 2023-01-04 VITALS
HEIGHT: 66 IN | BODY MASS INDEX: 23.78 KG/M2 | OXYGEN SATURATION: 97 % | TEMPERATURE: 99.4 F | WEIGHT: 148 LBS | RESPIRATION RATE: 18 BRPM | DIASTOLIC BLOOD PRESSURE: 70 MMHG | HEART RATE: 77 BPM | SYSTOLIC BLOOD PRESSURE: 102 MMHG

## 2023-01-04 DIAGNOSIS — Z12.31 SCREENING MAMMOGRAM FOR HIGH-RISK PATIENT: ICD-10-CM

## 2023-01-04 DIAGNOSIS — Z11.59 NEED FOR HEPATITIS C SCREENING TEST: ICD-10-CM

## 2023-01-04 DIAGNOSIS — Z13.6 SCREENING FOR CARDIOVASCULAR CONDITION: ICD-10-CM

## 2023-01-04 DIAGNOSIS — R73.09 ABNORMAL GLUCOSE: ICD-10-CM

## 2023-01-04 DIAGNOSIS — E55.9 VITAMIN D DEFICIENCY: ICD-10-CM

## 2023-01-04 DIAGNOSIS — E78.2 MIXED HYPERLIPIDEMIA: Primary | ICD-10-CM

## 2023-01-04 RX ORDER — COVID-19 MOLECULAR TEST ASSAY
KIT MISCELLANEOUS
COMMUNITY
Start: 2023-01-04

## 2023-01-04 NOTE — PROGRESS NOTES
FAMILY PRACTICE HEALTH MAINTENANCE OFFICE VISIT  [facility] - [address]    NAME: Ryan Soliz  AGE: 56 y o  SEX: female  : 1965     DATE: 2023    Assessment and Plan     {There are no diagnoses linked to this encounter  (Refresh or delete this SmartLink)}    Patient Counseling:   { Adult CPE Counseling DUDN:31347::"[id number]: Stressed importance of a well balanced diet, moderation of sodium/saturated fat, caloric balance and sufficient intake of fiber","Exercise: Stressed the importance of regular exercise with a goal of 150 minutes per week","Dental Health: Discussed daily flossing and brushing and regular dental visits "}    Immunizations reviewed: { immunization CPE list:75885}  Discussed benefits of:  { Adult CPE Screening counselin}  BMI Counseling: Body mass index is 23 89 kg/m²  Discussed with patient's BMI with her  The BMI {VB BMI Counselin}    No follow-ups on file          Chief Complaint     Chief Complaint   Patient presents with   • Physical Exam     Sas/cma       History of Present Illness     Pt is here for a full physical      Well Adult Physical   Patient here for a comprehensive physical exam       Diet and Physical Activity  Diet: {diet; well adult:86352}  Exercise: {exericse; well adult:82431}      Depression Screen  PHQ-2/9 Depression Screening    Little interest or pleasure in doing things: 0 - not at all  Feeling down, depressed, or hopeless: 0 - not at all  PHQ-2 Score: 0  PHQ-2 Interpretation: Negative depression screen          General Health  Hearing: {WELL ADULT DTVIRXR:86125}  Vision: {vision; well adult:98305}  Dental: {dental; well adult:62576}    Reproductive Health  { Adult CPE Screening counselin}      The following portions of the patient's history were reviewed and updated as appropriate: allergies, current medications, past family history, past medical history, past social history, past surgical history and problem list     Review of Systems     Review of Systems    Past Medical History     Past Medical History:   Diagnosis Date   • Anxiety    • Chronic sinusitis    • Depression    • GERD (gastroesophageal reflux disease)        Past Surgical History     Past Surgical History:   Procedure Laterality Date   • CERVICAL FUSION     • HERNIA REPAIR Left     inquinal   • OVARY SURGERY Right     oopherectomy    • KY EXC B9 LESION MRGN XCP SK TG S/N/H/F/G 0 5 CM/< Right 6/21/2022    Procedure: EXCISION LIPOMA OF BACK;  Surgeon: Genna King MD;  Location: [facility];  Service: General   • TOE SURGERY         Social History     Social History     Socioeconomic History   • Marital status: Single     Spouse name: None   • Number of children: None   • Years of education: None   • Highest education level: None   Occupational History   • None   Tobacco Use   • Smoking status: Every Day     Packs/day: 0 50     Types: Cigarettes   • Smokeless tobacco: Never   Vaping Use   • Vaping Use: Never used   Substance and Sexual Activity   • Alcohol use: Not Currently   • Drug use: Never   • Sexual activity: Not Currently   Other Topics Concern   • None   Social History Narrative   • None     Social Determinants of Health     Financial Resource Strain: Not on file   Food Insecurity: Not on file   Transportation Needs: Not on file   Physical Activity: Not on file   Stress: Not on file   Social Connections: Not on file   Intimate Partner Violence: Not on file   Housing Stability: Not on file       Family History     Family History   Problem Relation Age of Onset   • COPD Mother    • No Known Problems Father    • Breast cancer Maternal Grandmother    • Breast cancer Cousin    • Mental illness Neg Hx        Current Medications       Current Outpatient Medications:   •  atorvastatin (LIPITOR) 10 mg tablet, TAKE ONE TABLET BY MOUTH EVERY DAY, Disp: 90 tablet, Rfl: 1  •  EPINEPHrine (EPIPEN) 0 3 mg/0 3 mL SOAJ, Inject 0 3 mL (0 3 mg total) into a muscle once for 1 dose As needed for anaphylaxis, proceed to ER, Disp: 1 each, Rfl: 1  •  FLUoxetine (PROzac) 10 mg capsule, TAKE ONE CAPSULE BY MOUTH EVERY DAY, Disp: 90 capsule, Rfl: 1  •  omeprazole (PriLOSEC) 20 mg delayed release capsule, Take 20 mg by mouth daily, Disp: , Rfl:   •  BinaxNOW COVID-19 Ag Home Test KIT, , Disp: , Rfl:      Allergies     Allergies   Allergen Reactions   • Keflex [Cephalexin] Anaphylaxis   • Penicillins Facial Swelling   • Aspirin Other (See Comments)     Unknown - childhood   • Latex Rash   • Other Rash     Nitrile       Objective     /70   Pulse 77   Temp 99 4 °F (37 4 °C)   Resp 18   Ht 5' 6" (1 676 m)   Wt 67 1 kg (148 lb)   SpO2 97%   BMI 23 89 kg/m²      Physical Exam      Vision Screening    Right eye Left eye Both eyes   Without correction 20/25 20/40 20/20   With correction `             Sanjuana Moore, DO [facility]

## 2023-01-04 NOTE — PROGRESS NOTES
Assessment/Plan:    1  Mixed hyperlipidemia  -     CBC; Future  -     Comprehensive metabolic panel; Future  -     Lipid Panel with Direct LDL reflex; Future  -     Vitamin D 25 hydroxy; Future  -     CBC  -     Comprehensive metabolic panel  -     Hepatitis C antibody  -     Lipid Panel with Direct LDL reflex  -     Vitamin D 25 hydroxy    2  Abnormal glucose  -     CBC; Future  -     Comprehensive metabolic panel; Future  -     Lipid Panel with Direct LDL reflex; Future  -     Vitamin D 25 hydroxy; Future  -     CBC  -     Comprehensive metabolic panel  -     Hepatitis C antibody  -     Lipid Panel with Direct LDL reflex  -     Vitamin D 25 hydroxy  -     Hemoglobin A1C; Future  -     Hemoglobin A1C    3  Vitamin D deficiency  -     CBC; Future  -     Comprehensive metabolic panel; Future  -     Lipid Panel with Direct LDL reflex; Future  -     Vitamin D 25 hydroxy; Future  -     CBC  -     Comprehensive metabolic panel  -     Hepatitis C antibody  -     Lipid Panel with Direct LDL reflex  -     Vitamin D 25 hydroxy    4  Screening for cardiovascular condition  -     CBC; Future  -     Comprehensive metabolic panel; Future  -     Lipid Panel with Direct LDL reflex; Future  -     Vitamin D 25 hydroxy; Future  -     CBC  -     Comprehensive metabolic panel  -     Hepatitis C antibody  -     Lipid Panel with Direct LDL reflex  -     Vitamin D 25 hydroxy    5  Screening mammogram for high-risk patient  -     Mammo screening bilateral w 3d & cad; Future; Expected date: 01/04/2023    6  Need for hepatitis C screening test  -     Hepatitis C antibody; Future  -     Hepatitis C antibody          There are no Patient Instructions on file for this visit  Return for Annual physical     Subjective:      Patient ID: Lewis Paredes is a 56 y o  female      Chief Complaint   Patient presents with   • Physical Exam     Sas/cma       Pt is sched for a physical     Pt states she is recovering from covid so would like to do physical next time  Pt states she actually wants to get her labs done before her physical   Pt states she wants to start taking vitamins would like vitamin panels ordered  Pt states she stopped her statin because of muscle/joint pains  Pt has upcoming appt with OBGYN for pap/pelvic    Pt is recovering from covid  1st day of covid symptoms was Thursday last week - 6 days ago  Symptoms were fever, muscle achs          The following portions of the patient's history were reviewed and updated as appropriate: allergies, current medications, past family history, past medical history, past social history, past surgical history and problem list     Review of Systems   Constitutional: Positive for fatigue (improving)  Respiratory: Positive for shortness of breath (with exertion - also improving)  Musculoskeletal: Positive for myalgias (mostly recovered)  Current Outpatient Medications   Medication Sig Dispense Refill   • atorvastatin (LIPITOR) 10 mg tablet TAKE ONE TABLET BY MOUTH EVERY DAY 90 tablet 1   • EPINEPHrine (EPIPEN) 0 3 mg/0 3 mL SOAJ Inject 0 3 mL (0 3 mg total) into a muscle once for 1 dose As needed for anaphylaxis, proceed to ER 1 each 1   • FLUoxetine (PROzac) 10 mg capsule TAKE ONE CAPSULE BY MOUTH EVERY DAY 90 capsule 1   • omeprazole (PriLOSEC) 20 mg delayed release capsule Take 20 mg by mouth daily     • BinaxNOW COVID-19 Ag Home Test KIT  (Patient not taking: Reported on 1/4/2023)       No current facility-administered medications for this visit  Objective:    /70   Pulse 77   Temp 99 4 °F (37 4 °C)   Resp 18   Ht 5' 6" (1 676 m)   Wt 67 1 kg (148 lb)   SpO2 97%   BMI 23 89 kg/m²        Physical Exam  Vitals and nursing note reviewed  Constitutional:       General: She is not in acute distress  Appearance: She is well-developed  She is not diaphoretic  HENT:      Head: Normocephalic and atraumatic        Right Ear: External ear normal       Left Ear: External ear normal       Nose: Nose normal       Mouth/Throat:      Pharynx: No oropharyngeal exudate  Eyes:      General: No scleral icterus  Right eye: No discharge  Left eye: No discharge  Pupils: Pupils are equal, round, and reactive to light  Neck:      Thyroid: No thyromegaly  Cardiovascular:      Rate and Rhythm: Normal rate  Heart sounds: Normal heart sounds  No murmur heard  Pulmonary:      Effort: Pulmonary effort is normal  No respiratory distress  Breath sounds: Normal breath sounds  No wheezing  Abdominal:      General: Bowel sounds are normal  There is no distension  Palpations: Abdomen is soft  There is no mass  Tenderness: There is no abdominal tenderness  There is no guarding or rebound  Musculoskeletal:         General: Normal range of motion  Skin:     General: Skin is warm and dry  Findings: No erythema or rash  Neurological:      Mental Status: She is alert        Coordination: Coordination normal       Deep Tendon Reflexes: Reflexes normal    Psychiatric:         Behavior: Behavior normal                 Dhiraj Fuentes DO

## 2023-02-14 DIAGNOSIS — Z12.31 SCREENING MAMMOGRAM FOR HIGH-RISK PATIENT: ICD-10-CM

## 2023-02-15 ENCOUNTER — RA CDI HCC (OUTPATIENT)
Dept: OTHER | Facility: HOSPITAL | Age: 58
End: 2023-02-15

## 2023-02-15 NOTE — PROGRESS NOTES
[facility]  coding opportunities       Chart reviewed, no opportunity found: CHART REVIEWED, NO OPPORTUNITY FOUND        Patients Insurance        Commercial Insurance: Blanquita Berman

## 2023-02-26 LAB
25(OH)D3+25(OH)D2 SERPL-MCNC: 21.8 NG/ML (ref 30–100)
ALBUMIN SERPL-MCNC: 4.5 G/DL (ref 3.8–4.9)
ALBUMIN/GLOB SERPL: 2.3 {RATIO} (ref 1.2–2.2)
ALP SERPL-CCNC: 54 IU/L (ref 44–121)
ALT SERPL-CCNC: 8 IU/L (ref 0–32)
AST SERPL-CCNC: 16 IU/L (ref 0–40)
BILIRUB SERPL-MCNC: 0.2 MG/DL (ref 0–1.2)
BUN SERPL-MCNC: 8 MG/DL (ref 6–24)
BUN/CREAT SERPL: 9 (ref 9–23)
CALCIUM SERPL-MCNC: 9.8 MG/DL (ref 8.7–10.2)
CHLORIDE SERPL-SCNC: 100 MMOL/L (ref 96–106)
CHOLEST SERPL-MCNC: 297 MG/DL (ref 100–199)
CO2 SERPL-SCNC: 25 MMOL/L (ref 20–29)
CREAT SERPL-MCNC: 0.86 MG/DL (ref 0.57–1)
CYTOLOGY CMNT CVX/VAG CYTO-IMP: ABNORMAL
EGFR: 79 ML/MIN/1.73
ERYTHROCYTE [DISTWIDTH] IN BLOOD BY AUTOMATED COUNT: 13.7 % (ref 11.7–15.4)
EST. AVERAGE GLUCOSE BLD GHB EST-MCNC: 117 MG/DL
GLOBULIN SER-MCNC: 2 G/DL (ref 1.5–4.5)
GLUCOSE SERPL-MCNC: 101 MG/DL (ref 70–99)
HBA1C MFR BLD: 5.7 % (ref 4.8–5.6)
HCT VFR BLD AUTO: 39.8 % (ref 34–46.6)
HCV AB S/CO SERPL IA: NON REACTIVE
HDLC SERPL-MCNC: 58 MG/DL
HGB BLD-MCNC: 13.5 G/DL (ref 11.1–15.9)
LDLC SERPL CALC-MCNC: 230 MG/DL (ref 0–99)
LDLC/HDLC SERPL: 4 RATIO (ref 0–3.2)
MCH RBC QN AUTO: 29.9 PG (ref 26.6–33)
MCHC RBC AUTO-ENTMCNC: 33.9 G/DL (ref 31.5–35.7)
MCV RBC AUTO: 88 FL (ref 79–97)
MICRODELETION SYND BLD/T FISH: NORMAL
PLATELET # BLD AUTO: 317 X10E3/UL (ref 150–450)
POTASSIUM SERPL-SCNC: 5 MMOL/L (ref 3.5–5.2)
PROT SERPL-MCNC: 6.5 G/DL (ref 6–8.5)
RBC # BLD AUTO: 4.51 X10E6/UL (ref 3.77–5.28)
SL AMB VLDL CHOLESTEROL CALC: 9 MG/DL (ref 5–40)
SODIUM SERPL-SCNC: 137 MMOL/L (ref 134–144)
TRIGL SERPL-MCNC: 65 MG/DL (ref 0–149)
WBC # BLD AUTO: 6.9 X10E3/UL (ref 3.4–10.8)

## 2023-03-07 ENCOUNTER — OFFICE VISIT (OUTPATIENT)
Dept: FAMILY MEDICINE CLINIC | Facility: CLINIC | Age: 58
End: 2023-03-07

## 2023-03-07 ENCOUNTER — TELEPHONE (OUTPATIENT)
Dept: FAMILY MEDICINE CLINIC | Facility: CLINIC | Age: 58
End: 2023-03-07

## 2023-03-07 VITALS
SYSTOLIC BLOOD PRESSURE: 100 MMHG | DIASTOLIC BLOOD PRESSURE: 60 MMHG | HEART RATE: 77 BPM | TEMPERATURE: 96.8 F | HEIGHT: 65 IN | WEIGHT: 149 LBS | RESPIRATION RATE: 16 BRPM | BODY MASS INDEX: 24.83 KG/M2 | OXYGEN SATURATION: 99 %

## 2023-03-07 DIAGNOSIS — E55.9 VITAMIN D DEFICIENCY: ICD-10-CM

## 2023-03-07 DIAGNOSIS — R59.0 INGUINAL ADENOPATHY: ICD-10-CM

## 2023-03-07 DIAGNOSIS — E78.2 MIXED HYPERLIPIDEMIA: ICD-10-CM

## 2023-03-07 DIAGNOSIS — R68.89 COLD FEELING: ICD-10-CM

## 2023-03-07 DIAGNOSIS — Z00.00 WELL ADULT EXAM: Primary | ICD-10-CM

## 2023-03-07 DIAGNOSIS — R09.89 TONSIL SYMPTOM: ICD-10-CM

## 2023-03-07 DIAGNOSIS — Z72.0 TOBACCO ABUSE DISORDER: ICD-10-CM

## 2023-03-07 PROBLEM — K22.70 BARRETT'S ESOPHAGUS: Status: ACTIVE | Noted: 2023-03-07

## 2023-03-07 RX ORDER — ERGOCALCIFEROL 1.25 MG/1
50000 CAPSULE ORAL WEEKLY
Qty: 8 CAPSULE | Refills: 0 | Status: SHIPPED | OUTPATIENT
Start: 2023-03-07

## 2023-03-07 RX ORDER — AZITHROMYCIN 250 MG/1
TABLET, FILM COATED ORAL
Qty: 6 TABLET | Refills: 0 | Status: SHIPPED | OUTPATIENT
Start: 2023-03-07 | End: 2023-03-12

## 2023-03-07 RX ORDER — ROSUVASTATIN CALCIUM 10 MG/1
10 TABLET, COATED ORAL DAILY
Qty: 90 TABLET | Refills: 3 | Status: SHIPPED | OUTPATIENT
Start: 2023-03-07

## 2023-03-07 RX ORDER — ROSUVASTATIN CALCIUM 10 MG/1
TABLET, COATED ORAL EVERY 24 HOURS
COMMUNITY
End: 2023-03-07

## 2023-03-07 NOTE — TELEPHONE ENCOUNTER
Patient just saw Dr Philippe Ott  She wanted me to give him the message    HSP meaning [facility] purpura    Stated she spoke to DR Philippe Ott about this

## 2023-03-07 NOTE — PROGRESS NOTES
FAMILY PRACTICE HEALTH MAINTENANCE OFFICE VISIT  [facility] - [place] [facility]    NAME: Ivon Awan  AGE: 66 y o  SEX: female  : 1965     DATE: 3/7/2023    Assessment and Plan     1  Well adult exam    2  Mixed hyperlipidemia  -     rosuvastatin (CRESTOR) 10 MG tablet; Take 1 tablet (10 mg total) by mouth daily  -     Comprehensive metabolic panel; Future; Expected date: 2023  -     Lipid Panel with Direct LDL reflex; Future; Expected date: 2023  -     Comprehensive metabolic panel  -     Lipid Panel with Direct LDL reflex    3  Tobacco abuse disorder  Assessment & Plan:  Pt advised on cessation      4  Vitamin D deficiency  -     ergocalciferol (ERGOCALCIFEROL) 1 25 MG (81965 UT) capsule; Take 1 capsule (50,000 Units total) by mouth once a week    5  Cold feeling  -     TSH, 3rd generation; Future; Expected date: 2023  -     TSH, 3rd generation    6  Tonsil symptom  -     azithromycin (ZITHROMAX) 250 mg tablet; 2 tabs on day 1, 1 tab a day for 4 days after    7  Inguinal adenopathy  -     US pelvis complete non OB; Future; Expected date: 2023        Patient Counseling:   Nutrition: Stressed importance of a well balanced diet, moderation of sodium/saturated fat, caloric balance and sufficient intake of fiber  Exercise: Stressed the importance of regular exercise with a goal of 150 minutes per week  Dental Health: Discussed daily flossing and brushing and regular dental visits     Immunizations reviewed: Declined recommended vaccinations  Discussed benefits of:  Colon Cancer Screening, Mammogram , Cervical Cancer screening and Screening labs  BMI Counseling: Body mass index is 24 79 kg/m²  Discussed with patient's BMI with her  The BMI is acceptable    Return in about 3 months (around 2023) for Recheck          Chief Complaint     Chief Complaint   Patient presents with   • Physical Exam     Alexandra Leigh MA        History of Present Illness     Pt is here for a full physical  Pt had her labs  Pt states no matter what she does her lipids go up  As per pt  Thinks she may end up on vitamin    Pt has obgyn Pap/pelvic - New one - Dr Haile Basurto    Pt states she is always cold all of a sudden    Pt states in the past she had white spots in her throat  States she took abx and gargled and they cleared quickly  Thinks they are back  Does jhave an ENT    Pt states she has Barretts esophagus    Pt states every now and then she feels like she pulls a groin muscle - she feels a lump in her rt groin, wondering if she has an abscess or infection  Pt does not have the lump really as we speak  When she had it it was only painful when she touched it  Well Adult Physical   Patient here for a comprehensive physical exam       Diet and Physical Activity  Diet: well balanced diet  Exercise: frequently      Depression Screen  PHQ-2/9 Depression Screening    Little interest or pleasure in doing things: 0 - not at all  Feeling down, depressed, or hopeless: 0 - not at all  PHQ-2 Score: 0  PHQ-2 Interpretation: Negative depression screen          General Health  Hearing: Normal:  bilateral  Vision: wears glasses  Dental: regular dental visits    Reproductive Health  No issues       The following portions of the patient's history were reviewed and updated as appropriate: allergies, current medications, past family history, past medical history, past social history, past surgical history and problem list     Review of Systems     Review of Systems   Constitutional: Negative  Negative for activity change, appetite change, chills, diaphoresis and fatigue  HENT: Negative  Negative for dental problem, ear pain, sinus pressure and sore throat  Eyes: Negative  Negative for photophobia, pain, discharge, redness, itching and visual disturbance  Respiratory: Negative for apnea and chest tightness  Cardiovascular: Negative  Negative for chest pain, palpitations and leg swelling  Gastrointestinal: Negative  Negative for abdominal distention, abdominal pain, constipation and diarrhea  Endocrine: Negative  Negative for cold intolerance and heat intolerance  Genitourinary: Negative  Negative for difficulty urinating and dyspareunia  Pt feels tender in rt groin and a lump   Musculoskeletal: Negative  Negative for arthralgias and back pain  Skin: Negative  Allergic/Immunologic: Negative for environmental allergies  Neurological: Negative  Negative for dizziness  Psychiatric/Behavioral: Negative  Negative for agitation         Past Medical History     Past Medical History:   Diagnosis Date   • Anxiety    • Chronic sinusitis    • Depression    • GERD (gastroesophageal reflux disease)        Past Surgical History     Past Surgical History:   Procedure Laterality Date   • CERVICAL FUSION     • HERNIA REPAIR Left     inquinal   • OVARY SURGERY Right     oopherectomy    • KY EXC B9 LESION MRGN XCP SK TG S/N/H/F/G 0 5 CM/< Right 6/21/2022    Procedure: EXCISION LIPOMA OF BACK;  Surgeon: José Laurent MD;  [facility];  Service: General   • TOE SURGERY         Social History     Social History     Socioeconomic History   • Marital status: Single     Spouse name: None   • Number of children: None   • Years of education: None   • Highest education level: None   Occupational History   • None   Tobacco Use   • Smoking status: Every Day     Packs/day: 0 50     Types: Cigarettes   • Smokeless tobacco: Never   Vaping Use   • Vaping Use: Never used   Substance and Sexual Activity   • Alcohol use: Not Currently   • Drug use: Never   • Sexual activity: Not Currently   Other Topics Concern   • None   Social History Narrative   • None     Social Determinants of Health     Financial Resource Strain: Not on file   Food Insecurity: Not on file   Transportation Needs: Not on file   Physical Activity: Not on file   Stress: Not on file   Social Connections: Not on file   Intimate Partner Violence: Not on file   Housing Stability: Not on file       Family History     Family History   Problem Relation Age of Onset   • COPD Mother    • No Known Problems Father    • Breast cancer Maternal Grandmother    • Breast cancer Cousin    • Mental illness Neg Hx        Current Medications       Current Outpatient Medications:   •  azithromycin (ZITHROMAX) 250 mg tablet, 2 tabs on day 1, 1 tab a day for 4 days after, Disp: 6 tablet, Rfl: 0  •  EPINEPHrine (EPIPEN) 0 3 mg/0 3 mL SOAJ, Inject 0 3 mL (0 3 mg total) into a muscle once for 1 dose As needed for anaphylaxis, proceed to ER, Disp: 1 each, Rfl: 1  •  ergocalciferol (ERGOCALCIFEROL) 1 25 MG (77484 UT) capsule, Take 1 capsule (50,000 Units total) by mouth once a week, Disp: 8 capsule, Rfl: 0  •  FLUoxetine (PROzac) 10 mg capsule, TAKE ONE CAPSULE BY MOUTH EVERY DAY, Disp: 90 capsule, Rfl: 1  •  omeprazole (PriLOSEC) 20 mg delayed release capsule, Take 20 mg by mouth daily, Disp: , Rfl:   •  rosuvastatin (CRESTOR) 10 MG tablet, Take 1 tablet (10 mg total) by mouth daily, Disp: 90 tablet, Rfl: 3     Allergies     Allergies   Allergen Reactions   • Keflex [Cephalexin] Anaphylaxis   • Penicillins Facial Swelling   • Aspirin Other (See Comments)     Unknown - childhood   • Latex Rash   • Other Rash     Nitrile       Objective     /60   Pulse 77   Temp (!) 96 8 °F (36 °C)   Resp 16   Ht 5' 5" (1 651 m)   Wt 67 6 kg (149 lb)   SpO2 99%   BMI 24 79 kg/m²      Physical Exam  Vitals and nursing note reviewed  Constitutional:       General: She is not in acute distress  Appearance: She is well-developed  She is not diaphoretic  HENT:      Head: Normocephalic and atraumatic  Right Ear: External ear normal       Left Ear: External ear normal       Nose: Nose normal       Mouth/Throat:      Pharynx: No oropharyngeal exudate  Eyes:      General: No scleral icterus  Right eye: No discharge  Left eye: No discharge  Pupils: Pupils are equal, round, and reactive to light  Neck:      Thyroid: No thyromegaly  Cardiovascular:      Rate and Rhythm: Normal rate  Heart sounds: Normal heart sounds  No murmur heard  Pulmonary:      Effort: Pulmonary effort is normal  No respiratory distress  Breath sounds: Normal breath sounds  No wheezing  Abdominal:      General: Bowel sounds are normal  There is no distension  Palpations: Abdomen is soft  There is no mass  Tenderness: There is no abdominal tenderness  There is no guarding or rebound  Genitourinary:     Comments: Tender lump in rt groin    Musculoskeletal:         General: Normal range of motion  Skin:     General: Skin is warm and dry  Findings: No erythema or rash  Neurological:      Mental Status: She is alert        Coordination: Coordination normal       Deep Tendon Reflexes: Reflexes normal    Psychiatric:         Behavior: Behavior normal            Vision Screening    Right eye Left eye Both eyes   Without correction 20/25 20/25 20/20   With correction          Recent Results (from the past 672 hour(s))   CBC    Collection Time: 02/25/23  7:26 AM   Result Value Ref Range    White Blood Cell Count 6 9 3 4 - 10 8 x10E3/uL    Red Blood Cell Count 4 51 3 77 - 5 28 x10E6/uL    Hemoglobin 13 5 11 1 - 15 9 g/dL    HCT 39 8 34 0 - 46 6 %    MCV 88 79 - 97 fL    MCH 29 9 26 6 - 33 0 pg    MCHC 33 9 31 5 - 35 7 g/dL    RDW 13 7 11 7 - 15 4 %    Platelet Count 492 267 - 450 x10E3/uL   Comprehensive metabolic panel    Collection Time: 02/25/23  7:26 AM   Result Value Ref Range    Glucose, Random 101 (H) 70 - 99 mg/dL    BUN 8 6 - 24 mg/dL    Creatinine 0 86 0 57 - 1 00 mg/dL    eGFR 79 >59 mL/min/1 73    SL AMB BUN/CREATININE RATIO 9 9 - 23    Sodium 137 134 - 144 mmol/L    Potassium 5 0 3 5 - 5 2 mmol/L    Chloride 100 96 - 106 mmol/L    CO2 25 20 - 29 mmol/L    CALCIUM 9 8 8 7 - 10 2 mg/dL    Protein, Total 6 5 6 0 - 8 5 g/dL    Albumin 4 5 3 8 - 4 9 g/dL    Globulin, Total 2 0 1 5 - 4 5 g/dL    Albumin/Globulin Ratio 2 3 (H) 1 2 - 2 2    TOTAL BILIRUBIN 0 2 0 0 - 1 2 mg/dL    Alk Phos Isoenzymes 54 44 - 121 IU/L    AST 16 0 - 40 IU/L    ALT 8 0 - 32 IU/L   Hepatitis C antibody    Collection Time: 02/25/23  7:26 AM   Result Value Ref Range    HEP C AB Non Reactive Non Reactive   Lipid Panel with Direct LDL reflex    Collection Time: 02/25/23  7:26 AM   Result Value Ref Range    Cholesterol, Total 297 (H) 100 - 199 mg/dL    Triglycerides 65 0 - 149 mg/dL    HDL 58 >39 mg/dL    VLDL Cholesterol Calculated 9 5 - 40 mg/dL    LDL Calculated 230 (H) 0 - 99 mg/dL    Comment Comment     LDl/HDL Ratio 4 0 (H) 0 0 - 3 2 ratio   Vitamin D 25 hydroxy    Collection Time: 02/25/23  7:26 AM   Result Value Ref Range    25-HYDROXY VIT D 21 8 (L) 30 0 - 100 0 ng/mL   Cardiovascular Report    Collection Time: 02/25/23  7:26 AM   Result Value Ref Range    Interpretation Note    Hemoglobin A1C    Collection Time: 02/25/23  7:31 AM   Result Value Ref Range    Hemoglobin A1C 5 7 (H) 4 8 - 5 6 %    Estimated Average Glucose 117 mg/dL         DO AR[facility]

## 2023-03-11 ENCOUNTER — HOSPITAL ENCOUNTER (OUTPATIENT)
Dept: RADIOLOGY | Facility: HOSPITAL | Age: 58
Discharge: HOME | End: 2023-03-11
Attending: FAMILY MEDICINE

## 2023-03-11 DIAGNOSIS — R59.0 INGUINAL ADENOPATHY: ICD-10-CM

## 2023-03-20 ENCOUNTER — TELEPHONE (OUTPATIENT)
Dept: FAMILY MEDICINE CLINIC | Facility: CLINIC | Age: 58
End: 2023-03-20

## 2023-03-20 ENCOUNTER — OFFICE VISIT (OUTPATIENT)
Dept: OBGYN CLINIC | Facility: CLINIC | Age: 58
End: 2023-03-20

## 2023-03-20 VITALS
BODY MASS INDEX: 24.66 KG/M2 | WEIGHT: 148 LBS | SYSTOLIC BLOOD PRESSURE: 116 MMHG | DIASTOLIC BLOOD PRESSURE: 70 MMHG | HEIGHT: 65 IN

## 2023-03-20 DIAGNOSIS — Z12.4 SCREENING FOR MALIGNANT NEOPLASM OF THE CERVIX: ICD-10-CM

## 2023-03-20 DIAGNOSIS — Z01.419 ENCOUNTER FOR GYNECOLOGICAL EXAMINATION WITHOUT ABNORMAL FINDING: Primary | ICD-10-CM

## 2023-03-20 DIAGNOSIS — N90.89 VULVAR LESION: ICD-10-CM

## 2023-03-20 NOTE — TELEPHONE ENCOUNTER
Please call pt looks like the US of the groin did show lymph nodes  They seem reactive on study    This is usually secondary to and infection down stream to the area -the leg somewhere -  She should watch the area if they persist we can repeat the US to compare

## 2023-03-20 NOTE — TELEPHONE ENCOUNTER
Patient was confused with what was said in the message and wants you to call her  to clarify  Patient aware Dr Eduardo Winn will be back tomorrow    Debbie Mahmood

## 2023-03-21 NOTE — PROGRESS NOTES
Assessment/Plan:         Diagnoses and all orders for this visit:    Vulvar lesion  -     Herpes I/II IgG TEJAS w Reflex to HSV-2; Future  -     Herpes I/II IgG TEJAS w Reflex to HSV-2    Screening for malignant neoplasm of the cervix  -     Liquid-based pap, screening          Subjective:      Patient ID: Lewis Paredes is a 66 y o  female  The patient is a 61-year-old  1 para 1-0-0-1 who presents for annual exam   She noticed right inguinal adenopathy with no other enlarged lymph nodes palpable on the remainder of her body  Ultrasound confirms it was a simple lymph node enlargement on the right with no other abnormalities noted  Since the time of the ultrasound, the size of the lymph nodes have decreased  She has a history of abnormal Pap smears years ago and feels she is overdue for a Pap smear at this point  She did notice 2 small skin lesions prior to developing the lymphadenopathy  She feels they were unusually painful and may have been pimples  We will check an HSV titer as a precaution  She has no other complaints related to OB/GYN  She works as a nurse  We will perform a Pap smear today  Her pelvic exam is normal, no pathology was noted which might account for the inguinal lymphadenopathy  We will see her back in 1 year or as needed  The following portions of the patient's history were reviewed and updated as appropriate: allergies, current medications, past family history, past medical history, past social history, past surgical history and problem list     Review of Systems   Constitutional: Negative for chills, diaphoresis, fatigue, fever and unexpected weight change  HENT: Negative for congestion, sinus pressure, sinus pain, tinnitus and trouble swallowing  Eyes: Negative for visual disturbance  Respiratory: Negative for cough, chest tightness and shortness of breath  Cardiovascular: Negative for chest pain, palpitations and leg swelling     Gastrointestinal: Negative for abdominal distention, abdominal pain, anal bleeding, constipation, diarrhea, nausea, rectal pain and vomiting  Endocrine: Negative for heat intolerance  Genitourinary: Negative for difficulty urinating, dysuria, flank pain, frequency, genital sores, hematuria and urgency  Musculoskeletal: Negative for arthralgias, back pain and joint swelling  Skin: Negative for rash  Allergic/Immunologic: Negative for environmental allergies and food allergies  Neurological: Negative for headaches  Hematological: Negative for adenopathy  Does not bruise/bleed easily  Psychiatric/Behavioral: Negative for decreased concentration and dysphoric mood  The patient is not nervous/anxious  Objective:      /70 (BP Location: Left arm, Patient Position: Sitting, Cuff Size: Standard)   Ht 5' 5" (1 651 m)   Wt 67 1 kg (148 lb)   BMI 24 63 kg/m²          Physical Exam  Vitals and nursing note reviewed  Exam conducted with a chaperone present  Constitutional:       General: She is not in acute distress  Appearance: Normal appearance  She is normal weight  She is not ill-appearing  HENT:      Head: Normocephalic  Nose: Nose normal       Mouth/Throat:      Mouth: Mucous membranes are moist       Pharynx: Oropharynx is clear  Eyes:      Conjunctiva/sclera: Conjunctivae normal       Pupils: Pupils are equal, round, and reactive to light  Cardiovascular:      Rate and Rhythm: Normal rate and regular rhythm  Pulses: Normal pulses  Pulmonary:      Effort: Pulmonary effort is normal       Breath sounds: Normal breath sounds  Chest:   Breasts: Jules Score is 5  Right: Normal  No mass, nipple discharge, skin change or tenderness  Left: Normal  No mass, nipple discharge, skin change or tenderness  Abdominal:      General: Abdomen is flat  Bowel sounds are normal       Palpations: Abdomen is soft  Genitourinary:     General: Normal vulva  Exam position: Lithotomy position  Jules stage (genital): 5       Vagina: Normal       Cervix: Normal       Uterus: Normal        Adnexa: Right adnexa normal and left adnexa normal       Rectum: Normal    Musculoskeletal:         General: Normal range of motion  Cervical back: Neck supple  Lymphadenopathy:      Upper Body:      Right upper body: No axillary adenopathy  Left upper body: No axillary adenopathy  Skin:     General: Skin is warm and dry  Neurological:      General: No focal deficit present  Mental Status: She is alert     Psychiatric:         Mood and Affect: Mood normal

## 2023-03-23 LAB
HPV HR 12 DNA CVX QL NAA+PROBE: NEGATIVE
HPV16 DNA CVX QL NAA+PROBE: NEGATIVE
HPV18 DNA CVX QL NAA+PROBE: NEGATIVE

## 2023-03-28 LAB
LAB AP GYN PRIMARY INTERPRETATION: NORMAL
Lab: NORMAL

## 2023-04-07 ENCOUNTER — HOSPITAL ENCOUNTER (EMERGENCY)
Facility: HOSPITAL | Age: 58
Discharge: HOME | End: 2023-04-07
Attending: EMERGENCY MEDICINE

## 2023-04-07 VITALS
HEART RATE: 89 BPM | TEMPERATURE: 97.1 F | BODY MASS INDEX: 24.43 KG/M2 | WEIGHT: 146.61 LBS | DIASTOLIC BLOOD PRESSURE: 90 MMHG | RESPIRATION RATE: 18 BRPM | HEIGHT: 65 IN | SYSTOLIC BLOOD PRESSURE: 131 MMHG | OXYGEN SATURATION: 98 %

## 2023-04-07 DIAGNOSIS — R21 RASH: ICD-10-CM

## 2023-04-07 DIAGNOSIS — L50.9 URTICARIA: Primary | ICD-10-CM

## 2023-04-07 RX ORDER — CLINDAMYCIN HYDROCHLORIDE 150 MG/1
450 CAPSULE ORAL 3 TIMES DAILY
Qty: 90 CAPSULE | Refills: 0 | Status: SHIPPED | OUTPATIENT
Start: 2023-04-07 | End: 2023-04-17

## 2023-04-07 RX ORDER — PREDNISONE 20 MG/1
60 TABLET ORAL ONCE
Status: COMPLETED | OUTPATIENT
Start: 2023-04-07 | End: 2023-04-07

## 2023-04-07 RX ORDER — PREDNISONE 20 MG/1
40 TABLET ORAL DAILY
Qty: 8 TABLET | Refills: 0 | Status: SHIPPED | OUTPATIENT
Start: 2023-04-07 | End: 2023-04-11

## 2023-04-07 RX ADMIN — PREDNISONE 60 MG: 20 TABLET ORAL at 17:30

## 2023-04-07 NOTE — ED PROVIDER NOTES
After discharge pt consulted with RN friend and they requested re-assessment for possible cellulitis  I think this is unlikely; appears more allergic/inflammatory but Rx provided for clindamycin in case there is worsening as opposed to improvement with prednisone  Pt has PCP appt tomorrow for re-check        Elgin Bucio DO  04/07/23 1668

## 2023-04-07 NOTE — ED PROVIDER NOTES
History  Chief Complaint   Patient presents with   • Allergic Reaction     Rash since this am around neck and upper chest     82-year-old female presenting today with a rash that started last evening that worsened this morning noted along the left upper chest wall and top of the left shoulder, states that it is itchy and occasionally painful  Tender to touch  Has had allergic reactions before in the past   States that she had some tightness to the left portion of her neck that felt as though it was on the outside, she is no difficulty breathing or swallowing  Denies chest pain, shortness of breath, numbness, paresthesias, fevers  Prior to Admission Medications   Prescriptions Last Dose Informant Patient Reported? Taking?    EPINEPHrine (EPIPEN) 0 3 mg/0 3 mL SOAJ   No No   Sig: Inject 0 3 mL (0 3 mg total) into a muscle once for 1 dose As needed for anaphylaxis, proceed to ER   FLUoxetine (PROzac) 10 mg capsule 4/6/2023  No Yes   Sig: TAKE ONE CAPSULE BY MOUTH EVERY DAY   ergocalciferol (ERGOCALCIFEROL) 1 25 MG (34743 UT) capsule Unknown  No No   Sig: Take 1 capsule (50,000 Units total) by mouth once a week   omeprazole (PriLOSEC) 20 mg delayed release capsule 4/7/2023  Yes Yes   Sig: Take 20 mg by mouth daily   rosuvastatin (CRESTOR) 10 MG tablet 4/6/2023  No Yes   Sig: Take 1 tablet (10 mg total) by mouth daily      Facility-Administered Medications: None       Past Medical History:   Diagnosis Date   • Anxiety    • Chronic sinusitis    • GERD (gastroesophageal reflux disease)        Past Surgical History:   Procedure Laterality Date   • CERVICAL FUSION     • HERNIA REPAIR Left     inquinal   • OVARY SURGERY Right     oopherectomy    • PA EXC B9 LESION MRGN XCP SK TG S/N/H/F/G 0 5 CM/< Right 6/21/2022    Procedure: EXCISION LIPOMA OF BACK;  Surgeon: Laurann Cooks, MD;  Location: [facility];  Service: General   • TOE SURGERY         Family History   Problem Relation Age of Onset   • COPD Mother    • No Known Problems Father    • Diabetes Brother    • Breast cancer Maternal Grandmother    • Breast cancer Paternal Grandmother    • Breast cancer Cousin    • Mental illness Neg Hx      I have reviewed and agree with the history as documented  E-Cigarette/Vaping   • E-Cigarette Use Never User      E-Cigarette/Vaping Substances   • Nicotine No    • THC No    • CBD No    • Flavoring No    • Other No    • Unknown No      Social History     Tobacco Use   • Smoking status: Every Day     Packs/day: 0 50     Types: Cigarettes   • Smokeless tobacco: Never   Vaping Use   • Vaping Use: Never used   Substance Use Topics   • Alcohol use: Not Currently     Comment: none   • Drug use: Never       Review of Systems   Constitutional: Negative  Negative for chills, fatigue and fever  HENT: Negative  Negative for congestion, postnasal drip, rhinorrhea and sore throat  Eyes: Negative  Respiratory: Negative  Negative for cough, shortness of breath and wheezing  Cardiovascular: Negative  Gastrointestinal: Negative  Negative for abdominal pain, diarrhea, nausea and vomiting  Endocrine: Negative  Genitourinary: Negative  Musculoskeletal: Negative  Skin: Positive for color change and rash  Negative for pallor and wound  Neurological: Negative  Hematological: Negative  Psychiatric/Behavioral: Negative  All other systems reviewed and are negative  Physical Exam  Physical Exam  Vitals and nursing note reviewed  Constitutional:       Appearance: Normal appearance  HENT:      Head: Normocephalic and atraumatic  Right Ear: External ear normal       Left Ear: External ear normal       Nose: Nose normal       Mouth/Throat:      Mouth: Mucous membranes are moist       Pharynx: Oropharynx is clear  No oropharyngeal exudate or posterior oropharyngeal erythema  Eyes:      Conjunctiva/sclera: Conjunctivae normal    Cardiovascular:      Rate and Rhythm: Normal rate  Pulses: Normal pulses  Pulmonary:      Effort: Pulmonary effort is normal       Breath sounds: Normal breath sounds  Abdominal:      General: There is no distension  Musculoskeletal:         General: No deformity  Normal range of motion  Cervical back: Normal range of motion  Skin:     General: Skin is warm and dry  Capillary Refill: Capillary refill takes less than 2 seconds  Findings: Erythema and rash present  Neurological:      General: No focal deficit present  Mental Status: She is alert and oriented to person, place, and time  Mental status is at baseline  Psychiatric:         Mood and Affect: Mood normal          Behavior: Behavior normal          Thought Content: Thought content normal          Judgment: Judgment normal          Vital Signs  ED Triage Vitals [04/07/23 1652]   Temperature Pulse Respirations Blood Pressure SpO2   (!) 97 1 °F (36 2 °C) 89 18 131/90 98 %      Temp Source Heart Rate Source Patient Position - Orthostatic VS BP Location FiO2 (%)   Temporal Monitor Sitting Right arm --      Pain Score       6           Vitals:    04/07/23 1652   BP: 131/90   Pulse: 89   Patient Position - Orthostatic VS: Sitting         Visual Acuity      ED Medications  Medications   predniSONE tablet 60 mg (has no administration in time range)       Diagnostic Studies  Results Reviewed     None                 No orders to display              Procedures  Procedures         ED Course                                             Medical Decision Making  Localized hive noted on exam   Will treat as allergic reaction, no signs of cellulitis however given education regarding this diagnosis, should symptoms worsen or have spreading redness or fever she is to return for reevaluation  Patient has an EpiPen at home  Patient is informed to return to the emergency department for worsening of symptoms and was given proper education regarding their diagnosis and symptoms   Otherwise the patient is informed to follow up with their primary care doctor for re-evaluation  The patient verbalizes understanding and agrees with above assessment and plan  All questions were answered  Please Note: Fluency Direct voice recognition software may have been used in the creation of this document  Wrong words or sound a like substitutions may have occurred due to the inherent limitations of the voice software  Urticaria: acute illness or injury  Risk  Prescription drug management  Disposition  Final diagnoses:   Urticaria     Time reflects when diagnosis was documented in both MDM as applicable and the Disposition within this note     Time User Action Codes Description Comment    4/7/2023  5:17 PM Mario Barrientos Add [L50 9] Urticaria       ED Disposition     ED Disposition   Discharge    Condition   Stable    Date/Time   Fri Apr 7, 2023  5:17 PM    Comment   Joshua Light discharge to home/self care  Follow-up Information     Follow up With Specialties Details Why Contact Info Additional [address] Emergency Department Emergency Medicine Go to  If symptoms worsen, otherwise please follow up with your family doctor [address] [place] [address] [address] [address]  [address] [address] Emergency Department, [facility], [facility]          Patient's Medications   Discharge Prescriptions    PREDNISONE 20 MG TABLET    Take 2 tablets (40 mg total) by mouth daily for 4 days       Start Date: 4/7/2023  End Date: 4/11/2023       Order Dose: 40 mg       Quantity: 8 tablet    Refills: 0       No discharge procedures on file      PDMP Review     None          ED Provider  Electronically Signed by           Helen Lilly PA-C  04/07/23 2036

## 2023-04-08 ENCOUNTER — OFFICE VISIT (OUTPATIENT)
Dept: FAMILY MEDICINE CLINIC | Facility: CLINIC | Age: 58
End: 2023-04-08

## 2023-04-08 VITALS
SYSTOLIC BLOOD PRESSURE: 102 MMHG | TEMPERATURE: 97.7 F | HEART RATE: 84 BPM | WEIGHT: 145 LBS | BODY MASS INDEX: 24.16 KG/M2 | DIASTOLIC BLOOD PRESSURE: 68 MMHG | HEIGHT: 65 IN | OXYGEN SATURATION: 97 % | RESPIRATION RATE: 14 BRPM

## 2023-04-08 DIAGNOSIS — L50.9 URTICARIA: Primary | ICD-10-CM

## 2023-04-08 RX ORDER — PREDNISONE 20 MG/1
TABLET ORAL
Qty: 32 TABLET | Refills: 0 | Status: SHIPPED | OUTPATIENT
Start: 2023-04-08

## 2023-04-08 RX ORDER — TOBRAMYCIN AND DEXAMETHASONE 3; 1 MG/ML; MG/ML
SUSPENSION/ DROPS OPHTHALMIC
COMMUNITY
Start: 2023-03-16

## 2023-04-08 RX ORDER — MOMETASONE FUROATE 1 MG/G
CREAM TOPICAL DAILY
Qty: 45 G | Refills: 0 | Status: SHIPPED | OUTPATIENT
Start: 2023-04-08

## 2023-04-08 NOTE — PROGRESS NOTES
Assessment/Plan:    1  Urticaria  -     Ambulatory Referral to Allergy; Future  -     mometasone (ELOCON) 0 1 % cream; Apply topically daily  -     predniSONE 20 mg tablet; 4 tabs for three days, 3 tabs for three days, 2 tabs for three days, 1 tab for three days, 1/2 tab for 4 days          There are no Patient Instructions on file for this visit  No follow-ups on file  Subjective:      Patient ID: Efren Cook is a 66 y o  female  Chief Complaint   Patient presents with   • Rash     Left side of neck towards shoulder  Was swollen yesterday but the swelling has gone down  Slightly painful, itchy, and tingling  • Follow-up     Patient seen at ED for rash  klcma       Pt states she had a efren hot aspect of her left upper chest arm area  PT states she left work  Went to the ed  Ot was dx with a possible reaction given steroids  States she has pictures  States the redness has gone down   Still swollen  The following portions of the patient's history were reviewed and updated as appropriate: allergies, current medications, past family history, past medical history, past social history, past surgical history and problem list     Review of Systems   Skin: Positive for rash           Current Outpatient Medications   Medication Sig Dispense Refill   • clindamycin (CLEOCIN) 150 mg capsule Take 3 capsules (450 mg total) by mouth 3 (three) times a day for 10 days 90 capsule 0   • EPINEPHrine (EPIPEN) 0 3 mg/0 3 mL SOAJ Inject 0 3 mL (0 3 mg total) into a muscle once for 1 dose As needed for anaphylaxis, proceed to ER 1 each 1   • ergocalciferol (ERGOCALCIFEROL) 1 25 MG (38127 UT) capsule Take 1 capsule (50,000 Units total) by mouth once a week 8 capsule 0   • FLUoxetine (PROzac) 10 mg capsule TAKE ONE CAPSULE BY MOUTH EVERY DAY 90 capsule 1   • mometasone (ELOCON) 0 1 % cream Apply topically daily 45 g 0   • omeprazole (PriLOSEC) 20 mg delayed release capsule Take 20 mg by mouth daily     • predniSONE 20 "mg tablet Take 2 tablets (40 mg total) by mouth daily for 4 days 8 tablet 0   • predniSONE 20 mg tablet 4 tabs for three days, 3 tabs for three days, 2 tabs for three days, 1 tab for three days, 1/2 tab for 4 days 32 tablet 0   • rosuvastatin (CRESTOR) 10 MG tablet Take 1 tablet (10 mg total) by mouth daily 90 tablet 3   • tobramycin-dexamethasone (TOBRADEX) ophthalmic suspension      • EPINEPHrine (EPIPEN) 0 3 mg/0 3 mL SOAJ Inject 0 3 mL (0 3 mg total) into a muscle once for 1 dose As needed for anaphylaxis, proceed to ER 1 each 1     No current facility-administered medications for this visit         Objective:    /68   Pulse 84   Temp 97 7 °F (36 5 °C)   Resp 14   Ht 5' 5\" (1 651 m)   Wt 65 8 kg (145 lb)   SpO2 97%   BMI 24 13 kg/m²        Physical Exam  Skin:     Comments: Excoriated area on left shoulder  Possible double entry wound from bite  Red swollen                Severo Perch, DO  "

## 2023-07-12 DIAGNOSIS — F41.9 ANXIETY: ICD-10-CM

## 2023-07-12 RX ORDER — FLUOXETINE 10 MG/1
CAPSULE ORAL
Qty: 90 CAPSULE | Refills: 1 | Status: SHIPPED | OUTPATIENT
Start: 2023-07-12 | End: 2023-07-13

## 2023-07-13 DIAGNOSIS — F41.9 ANXIETY: ICD-10-CM

## 2023-07-13 RX ORDER — FLUOXETINE 10 MG/1
CAPSULE ORAL
Qty: 90 CAPSULE | Refills: 1 | Status: SHIPPED | OUTPATIENT
Start: 2023-07-13

## 2023-07-13 NOTE — TELEPHONE ENCOUNTER
Requested medication(s) are due for refill today: No  Patient has already received a courtesy refill: No  Other reason request has been forwarded to provider: please refuse, this was sent yesterday

## 2023-07-17 ENCOUNTER — TELEPHONE (OUTPATIENT)
Dept: FAMILY MEDICINE CLINIC | Facility: CLINIC | Age: 58
End: 2023-07-17

## 2023-07-17 DIAGNOSIS — T78.2XXA ANAPHYLAXIS, INITIAL ENCOUNTER: ICD-10-CM

## 2023-07-17 RX ORDER — EPINEPHRINE 0.3 MG/.3ML
0.3 INJECTION SUBCUTANEOUS ONCE
Qty: 2 EACH | Refills: 1 | Status: SHIPPED | OUTPATIENT
Start: 2023-07-17 | End: 2023-07-17

## 2024-02-08 DIAGNOSIS — F41.9 ANXIETY: ICD-10-CM

## 2024-02-08 RX ORDER — FLUOXETINE 10 MG/1
CAPSULE ORAL
Qty: 30 CAPSULE | Refills: 0 | Status: SHIPPED | OUTPATIENT
Start: 2024-02-08

## 2024-02-21 ENCOUNTER — RA CDI HCC (OUTPATIENT)
Dept: OTHER | Facility: HOSPITAL | Age: 59
End: 2024-02-21

## 2024-02-21 NOTE — PROGRESS NOTES
HCC coding opportunities       Chart reviewed, no opportunity found: CHART REVIEWED, NO OPPORTUNITY FOUND        Patients Insurance        Commercial Insurance: [employer]

## 2024-03-20 ENCOUNTER — OFFICE VISIT (OUTPATIENT)
Dept: FAMILY MEDICINE CLINIC | Facility: CLINIC | Age: 59
End: 2024-03-20
Payer: COMMERCIAL

## 2024-03-20 VITALS
RESPIRATION RATE: 16 BRPM | HEART RATE: 84 BPM | BODY MASS INDEX: 26.13 KG/M2 | DIASTOLIC BLOOD PRESSURE: 72 MMHG | TEMPERATURE: 98.7 F | SYSTOLIC BLOOD PRESSURE: 124 MMHG | WEIGHT: 157 LBS

## 2024-03-20 DIAGNOSIS — R73.09 ABNORMAL GLUCOSE: ICD-10-CM

## 2024-03-20 DIAGNOSIS — R06.83 SNORING: Primary | ICD-10-CM

## 2024-03-20 DIAGNOSIS — E78.2 MIXED HYPERLIPIDEMIA: ICD-10-CM

## 2024-03-20 DIAGNOSIS — Z12.31 SCREENING MAMMOGRAM FOR HIGH-RISK PATIENT: ICD-10-CM

## 2024-03-20 DIAGNOSIS — E55.9 VITAMIN D DEFICIENCY: ICD-10-CM

## 2024-03-20 DIAGNOSIS — I49.8 FLUTTERING HEART: ICD-10-CM

## 2024-03-20 DIAGNOSIS — T78.2XXA ANAPHYLAXIS, INITIAL ENCOUNTER: ICD-10-CM

## 2024-03-20 DIAGNOSIS — F41.9 ANXIETY: ICD-10-CM

## 2024-03-20 PROBLEM — R91.8 MULTIPLE NODULES OF LUNG: Status: ACTIVE | Noted: 2019-02-08

## 2024-03-20 PROBLEM — Z82.3 FAMILY HISTORY OF STROKE: Status: ACTIVE | Noted: 2019-02-08

## 2024-03-20 PROCEDURE — 99214 OFFICE O/P EST MOD 30 MIN: CPT | Performed by: FAMILY MEDICINE

## 2024-03-20 RX ORDER — FLUOXETINE 10 MG/1
5 TABLET, FILM COATED ORAL DAILY
Qty: 30 TABLET | Refills: 5 | Status: SHIPPED | OUTPATIENT
Start: 2024-03-20

## 2024-03-20 RX ORDER — EPINEPHRINE 0.3 MG/.3ML
0.3 INJECTION SUBCUTANEOUS ONCE
Qty: 2 EACH | Refills: 1 | Status: SHIPPED | OUTPATIENT
Start: 2024-03-20 | End: 2024-03-20

## 2024-03-20 NOTE — PROGRESS NOTES
"Assessment/Plan:    1. Snoring  -     Ambulatory Referral to Sleep Medicine; Future  -     TSH, 3rd generation; Future  -     TSH, 3rd generation    2. Anxiety  -     FLUoxetine (PROzac) 10 MG tablet; Take 0.5 tablets (5 mg total) by mouth daily Pt given wean instructions  -     TSH, 3rd generation; Future  -     TSH, 3rd generation    3. Mixed hyperlipidemia  -     Comprehensive metabolic panel; Future  -     TSH, 3rd generation; Future  -     Lipid Panel with Direct LDL reflex; Future  -     Comprehensive metabolic panel  -     TSH, 3rd generation  -     Lipid Panel with Direct LDL reflex    4. Abnormal glucose  -     TSH, 3rd generation; Future  -     TSH, 3rd generation    5. Vitamin D deficiency  -     TSH, 3rd generation; Future  -     Vitamin D 25 hydroxy; Future  -     TSH, 3rd generation  -     Vitamin D 25 hydroxy    6. Screening mammogram for high-risk patient  -     Mammo screening bilateral w 3d & cad; Future; Expected date: 03/20/2024    7. Fluttering heart  -     Ambulatory Referral to Cardiology; Future    8. Anaphylaxis, initial encounter  -     EPINEPHrine (EPIPEN) 0.3 mg/0.3 mL SOAJ; Inject 0.3 mL (0.3 mg total) into a muscle once for 1 dose As needed for anaphylaxis, proceed to ER          There are no Patient Instructions on file for this visit.    No follow-ups on file.    Subjective:      Patient ID: Josephine Murcia is a 59 y.o. female.    Chief Complaint   Patient presents with   • Follow-up   • Medication Management     Sas/cma       Pt is sched for a \"med check\"        The following portions of the patient's history were reviewed and updated as appropriate: allergies, current medications, past family history, past medical history, past social history, past surgical history and problem list.    Review of Systems      Current Outpatient Medications   Medication Sig Dispense Refill   • EPINEPHrine (EPIPEN) 0.3 mg/0.3 mL SOAJ Inject 0.3 mL (0.3 mg total) into a muscle once for 1 dose As needed for " anaphylaxis, proceed to ER 2 each 1   • FLUoxetine (PROzac) 10 MG tablet Take 0.5 tablets (5 mg total) by mouth daily Pt given wean instructions 30 tablet 5   • ergocalciferol (ERGOCALCIFEROL) 1.25 MG (63819 UT) capsule Take 1 capsule (50,000 Units total) by mouth once a week 8 capsule 0   • mometasone (ELOCON) 0.1 % cream Apply topically daily 45 g 0   • omeprazole (PriLOSEC) 20 mg delayed release capsule Take 20 mg by mouth daily     • predniSONE 20 mg tablet 4 tabs for three days, 3 tabs for three days, 2 tabs for three days, 1 tab for three days, 1/2 tab for 4 days 32 tablet 0   • rosuvastatin (CRESTOR) 10 MG tablet Take 1 tablet (10 mg total) by mouth daily 90 tablet 3   • tobramycin-dexamethasone (TOBRADEX) ophthalmic suspension        No current facility-administered medications for this visit.       Objective:    /72   Pulse 84   Temp 98.7 °F (37.1 °C)   Resp 16   Wt 71.2 kg (157 lb)   BMI 26.13 kg/m²        Physical Exam           Frank Lombardi, DO

## 2024-03-20 NOTE — PROGRESS NOTES
Assessment/Plan:    1. Snoring  -     Ambulatory Referral to Sleep Medicine; Future  -     TSH, 3rd generation; Future  -     TSH, 3rd generation    2. Anxiety  -     FLUoxetine (PROzac) 10 MG tablet; Take 0.5 tablets (5 mg total) by mouth daily Pt given wean instructions  -     TSH, 3rd generation; Future  -     TSH, 3rd generation    3. Mixed hyperlipidemia  -     Comprehensive metabolic panel; Future  -     TSH, 3rd generation; Future  -     Lipid Panel with Direct LDL reflex; Future  -     Comprehensive metabolic panel  -     TSH, 3rd generation  -     Lipid Panel with Direct LDL reflex    4. Abnormal glucose  -     TSH, 3rd generation; Future  -     TSH, 3rd generation    5. Vitamin D deficiency  -     TSH, 3rd generation; Future  -     Vitamin D 25 hydroxy; Future  -     TSH, 3rd generation  -     Vitamin D 25 hydroxy    6. Screening mammogram for high-risk patient  -     Mammo screening bilateral w 3d & cad; Future; Expected date: 03/20/2024    7. Fluttering heart  -     Ambulatory Referral to Cardiology; Future    8. Anaphylaxis, initial encounter  -     EPINEPHrine (EPIPEN) 0.3 mg/0.3 mL SOAJ; Inject 0.3 mL (0.3 mg total) into a muscle once for 1 dose As needed for anaphylaxis, proceed to ER            There are no Patient Instructions on file for this visit.    No follow-ups on file.    Subjective:      Patient ID: Josephine Murcia is a 59 y.o. female.    Chief Complaint   Patient presents with   • Follow-up   • Medication Management     Sas/cma       Pt states she has bene taking prozac -   States she was taking it for PMS and OCD - she was placed on it by her Gyno.  She has taken it for a number of years.  States she does not know if she would need it    Pt states since she can rem she feels she has sleep apnea - she wakes up gasping.  Sometime sshe has it sometimes not.  Few weeks ago it was really bad.  Pt states she has been told she snores  Pt can be fatigued during the day    Pt states she has been doing  better with crestor - not good enough that she wanted to get labs.     Pt has had cardiac workups - in the past      Pt states she gets fluttering in her chest at night has been worked up in the past and would like to see a local cardiologist        The following portions of the patient's history were reviewed and updated as appropriate: allergies, current medications, past family history, past medical history, past social history, past surgical history and problem list.    Review of Systems   Constitutional: Negative.  Negative for activity change, appetite change, chills, diaphoresis and fatigue.   HENT: Negative.  Negative for dental problem, ear pain, sinus pressure and sore throat.    Eyes: Negative.  Negative for photophobia, pain, discharge, redness, itching and visual disturbance.   Respiratory:  Negative for apnea and chest tightness.    Cardiovascular: Negative.  Negative for chest pain, palpitations and leg swelling.   Gastrointestinal: Negative.  Negative for abdominal distention, abdominal pain, constipation and diarrhea.   Endocrine: Negative.  Negative for cold intolerance and heat intolerance.   Genitourinary: Negative.  Negative for difficulty urinating and dyspareunia.   Musculoskeletal: Negative.  Negative for arthralgias and back pain.   Skin: Negative.    Allergic/Immunologic: Negative for environmental allergies.   Neurological: Negative.  Negative for dizziness.   Psychiatric/Behavioral: Negative.  Negative for agitation.          Current Outpatient Medications   Medication Sig Dispense Refill   • EPINEPHrine (EPIPEN) 0.3 mg/0.3 mL SOAJ Inject 0.3 mL (0.3 mg total) into a muscle once for 1 dose As needed for anaphylaxis, proceed to ER 2 each 1   • FLUoxetine (PROzac) 10 MG tablet Take 0.5 tablets (5 mg total) by mouth daily Pt given wean instructions 30 tablet 5   • ergocalciferol (ERGOCALCIFEROL) 1.25 MG (95472 UT) capsule Take 1 capsule (50,000 Units total) by mouth once a week 8 capsule 0    • mometasone (ELOCON) 0.1 % cream Apply topically daily 45 g 0   • omeprazole (PriLOSEC) 20 mg delayed release capsule Take 20 mg by mouth daily     • predniSONE 20 mg tablet 4 tabs for three days, 3 tabs for three days, 2 tabs for three days, 1 tab for three days, 1/2 tab for 4 days 32 tablet 0   • rosuvastatin (CRESTOR) 10 MG tablet Take 1 tablet (10 mg total) by mouth daily 90 tablet 3   • tobramycin-dexamethasone (TOBRADEX) ophthalmic suspension        No current facility-administered medications for this visit.       Objective:    /72   Pulse 84   Temp 98.7 °F (37.1 °C)   Resp 16   Wt 71.2 kg (157 lb)   BMI 26.13 kg/m²        Physical Exam  Vitals and nursing note reviewed.   Constitutional:       General: She is not in acute distress.     Appearance: She is well-developed. She is not diaphoretic.   HENT:      Head: Normocephalic and atraumatic.      Right Ear: External ear normal.      Left Ear: External ear normal.      Nose: Nose normal.      Mouth/Throat:      Pharynx: No oropharyngeal exudate.   Eyes:      General: No scleral icterus.        Right eye: No discharge.         Left eye: No discharge.      Pupils: Pupils are equal, round, and reactive to light.   Neck:      Thyroid: No thyromegaly.   Cardiovascular:      Rate and Rhythm: Normal rate.      Heart sounds: Normal heart sounds. No murmur heard.  Pulmonary:      Effort: Pulmonary effort is normal. No respiratory distress.      Breath sounds: Normal breath sounds. No wheezing.   Abdominal:      General: Bowel sounds are normal. There is no distension.      Palpations: Abdomen is soft. There is no mass.      Tenderness: There is no abdominal tenderness. There is no guarding or rebound.   Musculoskeletal:         General: Normal range of motion.   Skin:     General: Skin is warm and dry.      Findings: No erythema or rash.   Neurological:      Mental Status: She is alert.      Coordination: Coordination normal.      Deep Tendon Reflexes:  Reflexes normal.   Psychiatric:         Behavior: Behavior normal.                Frank Lombardi, DO

## 2024-05-06 ENCOUNTER — OFFICE VISIT (OUTPATIENT)
Dept: CARDIOLOGY CLINIC | Facility: CLINIC | Age: 59
End: 2024-05-06
Payer: COMMERCIAL

## 2024-05-06 VITALS
BODY MASS INDEX: 26.82 KG/M2 | WEIGHT: 161 LBS | SYSTOLIC BLOOD PRESSURE: 100 MMHG | HEART RATE: 72 BPM | DIASTOLIC BLOOD PRESSURE: 70 MMHG | OXYGEN SATURATION: 98 % | HEIGHT: 65 IN

## 2024-05-06 DIAGNOSIS — Z82.3 FAMILY HISTORY OF STROKE: ICD-10-CM

## 2024-05-06 DIAGNOSIS — Z72.0 TOBACCO ABUSE DISORDER: ICD-10-CM

## 2024-05-06 DIAGNOSIS — R00.2 PALPITATIONS: ICD-10-CM

## 2024-05-06 DIAGNOSIS — E78.2 MIXED HYPERLIPIDEMIA: ICD-10-CM

## 2024-05-06 DIAGNOSIS — I49.8 FLUTTERING HEART: ICD-10-CM

## 2024-05-06 DIAGNOSIS — F41.9 ANXIETY: ICD-10-CM

## 2024-05-06 PROCEDURE — 93000 ELECTROCARDIOGRAM COMPLETE: CPT | Performed by: INTERNAL MEDICINE

## 2024-05-06 PROCEDURE — 99214 OFFICE O/P EST MOD 30 MIN: CPT | Performed by: INTERNAL MEDICINE

## 2024-05-06 NOTE — PROGRESS NOTES
Consultation - Cardiology Office  [facility].    Josephine Murcia 59 y.o. female MRN: [id number]  : 1965  Unit/Bed#: ? Encounter: [id number]      Assessment:     1. Fluttering heart    2. Palpitations    3. Tobacco abuse disorder    4. Mixed hyperlipidemia    5. Family history of stroke    6. Anxiety        Discussion summary and Plan:      1.  Fluttering of the heart with palpitations.  She is having palpitations.  Most likely etiology appears to be premature atrial contraction or PVCs.  Lifestyle modification recommended.  We need to capture those beats.  Will get a 48-hour monitoring as she gets almost every day.  She will cut back on her caffeine I encouraged her to drink more fluid and cut back on smoking.  Will also get echo Doppler to rule out structural heart disease and monitoring has been ordered.  Previous coronary CTA reviewed.    2.  Dyslipidemia with cholesterol around 300 as per guidelines she need to be on statin therapy with LDL goal less than 100    3.  History of tobacco abuse encouraged her to quit smoking that will help tremendously.  Lifestyle modification recommended    4.  Family history of stroke with personal history of questionable stroke possible migraine    5.  History of anxiety    Plan.  Her labs reviewed her previous cardiac workup reviewed.  Lifestyle modification recommended.  Check echo and Holter monitor.  If still continues to have palpitations or significant arrhythmias noted we will consider medical therapy.  Advised her to quit smoking.  She agreed with that plan.    Patient / Caretaker was advised and educated to call our office  immediately if  patient has any new symptoms of chest pain/shortness of breath, near-syncope, syncope, light headedness sustained palpitations or any other cardiovascular symptoms before their scheduled follow-up appointment.  Office number was provided #[phone].      Thank you for your consultation.  If you have any  question please call me at [phone]    Counseling :  A description of the counseling.  Goals and Barriers.  Patient's ability to self care: Yes  Medication side effect reviewed with patient in detail and all their questions answered to their satisfaction.      Primary Care Physician Requesting Consult: Frank Lombardi, DO    Reason for Consult / Principal Problem: Palpitations            HPI :     Josephine Murcia is a 59 y.o. year old female who was referred by primary care doctor for palpitations.  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  She used to follow-up with a cardiologist in [place] and has a CT of the coronaries done whose report is available which shows no obstructive disease at that time and she has a normal LV systolic function with interatrial aneurysm.  There was no shunting noted at the time.  She also admitted that she drinks 4 to 5 cups of coffee a day and she is trying to cut back.  She lives alone.  She has active lifestyle.  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  No other recent surgery.    This palpitations last less than a minute.  She never felt she is going to pass out but she has felt fluttering multiple times.  Today EKG shows sinus rhythm with a heart rate 72 bpm her previous records reviewed.    Review of Systems   Constitutional:  Negative for activity change, chills, diaphoresis, fever and unexpected weight change.   HENT:  Negative for congestion.    Eyes:  Negative for discharge and redness.   Respiratory:  Negative for cough, chest tightness, shortness of breath and wheezing.    Cardiovascular:  Positive for palpitations. Negative for chest pain and leg swelling.    Gastrointestinal:  Negative for abdominal pain, diarrhea and nausea.   Endocrine: Negative.    Genitourinary:  Negative for decreased urine volume and urgency.   Musculoskeletal: Negative.  Negative for arthralgias, back pain and gait problem.   Skin:  Negative for rash and wound.   Allergic/Immunologic: Negative.    Neurological:  Negative for dizziness, seizures, syncope, weakness, light-headedness and headaches.   Hematological: Negative.    Psychiatric/Behavioral:  Negative for agitation and confusion. The patient is nervous/anxious.        Historical Information   Past Medical History:   Diagnosis Date   • Anxiety    • Chronic sinusitis    • GERD (gastroesophageal reflux disease)      Past Surgical History:   Procedure Laterality Date   • CERVICAL FUSION     • HERNIA REPAIR Left     inquinal   • OVARY SURGERY Right     oopherectomy    • HI EXC B9 LESION MRGN XCP SK TG S/N/H/F/G 0.5 CM/< Right 6/21/2022    Procedure: EXCISION LIPOMA OF BACK;  Surgeon: Martinze Cervantes MD;  Location: [facility];  Service: General   • TOE SURGERY       Social History     Substance and Sexual Activity   Alcohol Use Not Currently    Comment: none     Social History     Substance and Sexual Activity   Drug Use Never     Social History     Tobacco Use   Smoking Status Every Day   • Current packs/day: 0.50   • Types: Cigarettes   Smokeless Tobacco Never     Family History:   Family History   Problem Relation Age of Onset   • Hypertension Mother    • COPD Mother    • No Known Problems Father    • Diabetes Brother    • Breast cancer Maternal Grandmother    • Breast cancer Paternal Grandmother    • Breast cancer Cousin    • Mental illness Neg Hx        Meds/Allergies     Allergies   Allergen Reactions   • Keflex [Cephalexin] Anaphylaxis   • Penicillins Facial Swelling   • Aspirin Other (See Comments)     Unknown - childhood   • Latex Rash   • Other Rash     Nitrile       Current Outpatient Medications:   •  ergocalciferol (ERGOCALCIFEROL)  "1.25 MG (54591 UT) capsule, Take 1 capsule (50,000 Units total) by mouth once a week, Disp: 8 capsule, Rfl: 0  •  FLUoxetine (PROzac) 10 MG tablet, Take 0.5 tablets (5 mg total) by mouth daily Pt given wean instructions, Disp: 30 tablet, Rfl: 5  •  mometasone (ELOCON) 0.1 % cream, Apply topically daily, Disp: 45 g, Rfl: 0  •  omeprazole (PriLOSEC) 20 mg delayed release capsule, Take 20 mg by mouth daily, Disp: , Rfl:   •  rosuvastatin (CRESTOR) 10 MG tablet, Take 1 tablet (10 mg total) by mouth daily, Disp: 90 tablet, Rfl: 3  •  tobramycin-dexamethasone (TOBRADEX) ophthalmic suspension, , Disp: , Rfl:   •  EPINEPHrine (EPIPEN) 0.3 mg/0.3 mL SOAJ, Inject 0.3 mL (0.3 mg total) into a muscle once for 1 dose As needed for anaphylaxis, proceed to ER, Disp: 2 each, Rfl: 1  •  predniSONE 20 mg tablet, 4 tabs for three days, 3 tabs for three days, 2 tabs for three days, 1 tab for three days, 1/2 tab for 4 days, Disp: 32 tablet, Rfl: 0    Vitals: Blood pressure 100/70, pulse 72, height 5' 5\" (1.651 m), weight 73 kg (161 lb), SpO2 98%.  ?  Body mass index is 26.79 kg/m².  Wt Readings from Last 3 Encounters:   05/06/24 73 kg (161 lb)   03/20/24 71.2 kg (157 lb)   04/08/23 65.8 kg (145 lb)     Vitals:    05/06/24 1542   Weight: 73 kg (161 lb)     BP Readings from Last 3 Encounters:   05/06/24 100/70   03/20/24 124/72   04/08/23 102/68         Physical Exam    Neurologic:  Alert & oriented x 3, no new focal deficits, Not in any acute distress,  Constitutional:  Adequate built, non-toxic appearance   Neck: Normal range of motion, no tenderness,  Neck supple   Respiratory:  Bilateral air entry, mostly clear to auscultation  Cardiovascular: S1-S2 regular with a 2/6 ejection systolic murmur and S4 is present  GI:  Soft, nondistended,  nontender, no hepatosplenomegaly appreciated.  Musculoskeletal:  No edema, no tenderness, no deformities.   Skin:  Well hydrated, no rash   Extremities:  No edema and distal pulses are " "present  Psychiatric:  Speech and behavior appropriate     Diagnostic Studies Review Cardio:  None recently    Echo Doppler done in 11/2/2016 in [place] shows interatrial septum aneurysm with no evidence of interatrial shunting normal chamber size no other significant valvular disease.    Her stress EKG test was abnormal and she underwent coronary CTA which shows no evidence of any obstructive coronary artery disease.  It was done on 12/21/2016.  Read as left main normal LAD normal, circumflex normal.  Right coronary artery normal and tricuspid aortic valve.    EKG:    Twelve-lead EKG done on 5/6/2024 # some heart rate 72 bpm.  Lab Review   Lab Results   Component Value Date    WBC 6.9 02/25/2023    HGB 13.5 02/25/2023    HCT 39.8 02/25/2023    MCV 88 02/25/2023    RDW 13.7 02/25/2023     02/25/2023     BMP:  Lab Results   Component Value Date    SODIUM 137 02/25/2023    K 5.0 02/25/2023     02/25/2023    CO2 25 02/25/2023    BUN 8 02/25/2023    CREATININE 0.86 02/25/2023    GLUC 101 (H) 02/25/2023    CALCIUM 9.1 10/22/2021    EGFR 79 02/25/2023    MG 2.1 06/12/2021     Troponins:    LFT:  Lab Results   Component Value Date    AST 16 02/25/2023    ALT 8 02/25/2023    TP 6.5 02/25/2023    ALB 4.5 02/25/2023        Lab Results   Component Value Date    HGBA1C 5.7 (H) 02/25/2023     Lipid Profile:   Lab Results   Component Value Date    CHOLESTEROL 297 (H) 02/25/2023    HDL 58 02/25/2023    LDLCALC 230 (H) 02/25/2023    TRIG 65 02/25/2023     Lab Results   Component Value Date    CHOLESTEROL 297 (H) 02/25/2023    CHOLESTEROL 292 (H) 02/23/2022     Lab Results   Component Value Date    CKTOTAL 59 06/12/2021             Dr. Ethan Conroy MD [facility]      \"This note was completed in part utilizing [employer] direct voice recognition software.   Grammatical errors, random word insertion, spelling mistakes, and incomplete sentences may be an occasional consequence of the system secondary to software " "limitations, ambient noise and hardware issues.    Please read the chart carefully and recognize, using context, where substitutions have occurred.  If you have any questions or concerns about the context, text or information contained within the body of this dictation, please contact myself, the provider, for further clarification.\"  "

## 2024-05-09 ENCOUNTER — HOSPITAL ENCOUNTER (OUTPATIENT)
Dept: NON INVASIVE DIAGNOSTICS | Facility: HOSPITAL | Age: 59
Discharge: HOME | End: 2024-05-09
Attending: INTERNAL MEDICINE
Payer: COMMERCIAL

## 2024-05-09 DIAGNOSIS — Z72.0 TOBACCO ABUSE DISORDER: ICD-10-CM

## 2024-05-09 DIAGNOSIS — Z82.3 FAMILY HISTORY OF STROKE: ICD-10-CM

## 2024-05-09 DIAGNOSIS — I49.8 FLUTTERING HEART: ICD-10-CM

## 2024-05-09 DIAGNOSIS — E78.2 MIXED HYPERLIPIDEMIA: ICD-10-CM

## 2024-05-09 DIAGNOSIS — F41.9 ANXIETY: ICD-10-CM

## 2024-05-09 DIAGNOSIS — R00.2 PALPITATIONS: ICD-10-CM

## 2024-05-09 PROCEDURE — 93225 XTRNL ECG REC<48 HRS REC: CPT

## 2024-05-09 PROCEDURE — 93226 XTRNL ECG REC<48 HR SCAN A/R: CPT

## 2024-05-17 ENCOUNTER — APPOINTMENT (OUTPATIENT)
Dept: NON INVASIVE DIAGNOSTICS | Facility: HOSPITAL | Age: 59
End: 2024-05-17
Attending: INTERNAL MEDICINE
Payer: COMMERCIAL

## 2024-05-17 ENCOUNTER — HOSPITAL ENCOUNTER (OUTPATIENT)
Dept: NON INVASIVE DIAGNOSTICS | Facility: HOSPITAL | Age: 59
Discharge: HOME | End: 2024-05-17
Attending: INTERNAL MEDICINE
Payer: COMMERCIAL

## 2024-05-17 VITALS
WEIGHT: 161 LBS | SYSTOLIC BLOOD PRESSURE: 118 MMHG | HEART RATE: 67 BPM | BODY MASS INDEX: 26.82 KG/M2 | DIASTOLIC BLOOD PRESSURE: 78 MMHG | HEIGHT: 65 IN

## 2024-05-17 DIAGNOSIS — F41.9 ANXIETY: ICD-10-CM

## 2024-05-17 DIAGNOSIS — E78.2 MIXED HYPERLIPIDEMIA: ICD-10-CM

## 2024-05-17 DIAGNOSIS — Z72.0 TOBACCO ABUSE DISORDER: ICD-10-CM

## 2024-05-17 DIAGNOSIS — Z82.3 FAMILY HISTORY OF STROKE: ICD-10-CM

## 2024-05-17 DIAGNOSIS — R00.2 PALPITATIONS: ICD-10-CM

## 2024-05-17 DIAGNOSIS — I49.8 FLUTTERING HEART: ICD-10-CM

## 2024-05-17 PROCEDURE — 93306 TTE W/DOPPLER COMPLETE: CPT

## 2024-05-17 PROCEDURE — 93306 TTE W/DOPPLER COMPLETE: CPT | Performed by: INTERNAL MEDICINE

## 2024-05-19 LAB
AORTIC ROOT: 3 CM
AV LVOT PEAK GRADIENT: 3 MMHG
AV PEAK GRADIENT: 4 MMHG
BSA FOR ECHO PROCEDURE: 1.8 M2
DOP CALC LVOT AREA: 3.14 CM2
DOP CALC LVOT DIAMETER: 2 CM
E WAVE DECELERATION TIME: 160 MS
E/A RATIO: 1.31
FRACTIONAL SHORTENING: 35 (ref 28–44)
GLOBAL LONGITUIDAL STRAIN: -18 %
INTERVENTRICULAR SEPTUM IN DIASTOLE (PARASTERNAL SHORT AXIS VIEW): 0.7 CM
INTERVENTRICULAR SEPTUM: 0.7 CM (ref 0.6–1.1)
LAAS-AP2: 17.6 CM2
LAAS-AP4: 17.3 CM2
LEFT ATRIUM AREA SYSTOLE SINGLE PLANE A4C: 17 CM2
LEFT ATRIUM SIZE: 2.8 CM
LEFT ATRIUM VOLUME (MOD BIPLANE): 44 ML
LEFT ATRIUM VOLUME INDEX (MOD BIPLANE): 24.4 ML/M2
LEFT INTERNAL DIMENSION IN SYSTOLE: 2.6 CM (ref 2.1–4)
LEFT VENTRICULAR INTERNAL DIMENSION IN DIASTOLE: 4 CM (ref 3.5–6)
LEFT VENTRICULAR POSTERIOR WALL IN END DIASTOLE: 0.7 CM
LEFT VENTRICULAR STROKE VOLUME: 46 ML
LVSV (TEICH): 46 ML
MV E'TISSUE VEL-LAT: 12 CM/S
MV E'TISSUE VEL-SEP: 10 CM/S
MV PEAK A VEL: 0.59 M/S
MV PEAK E VEL: 77 CM/S
MV STENOSIS PRESSURE HALF TIME: 46 MS
MV VALVE AREA P 1/2 METHOD: 4.78
PV PEAK GRADIENT: 2 MMHG
RIGHT ATRIUM AREA SYSTOLE A4C: 13 CM2
RIGHT VENTRICLE ID DIMENSION: 2.8 CM
SL CV LEFT ATRIUM LENGTH A2C: 5.4 CM
SL CV LV EF: 65
SL CV PED ECHO LEFT VENTRICLE DIASTOLIC VOLUME (MOD BIPLANE) 2D: 71 ML
SL CV PED ECHO LEFT VENTRICLE SYSTOLIC VOLUME (MOD BIPLANE) 2D: 25 ML
TRICUSPID ANNULAR PLANE SYSTOLIC EXCURSION: 2.3 CM

## 2024-05-20 ENCOUNTER — TELEPHONE (OUTPATIENT)
Dept: CARDIOLOGY CLINIC | Facility: CLINIC | Age: 59
End: 2024-05-20

## 2024-05-20 NOTE — TELEPHONE ENCOUNTER
Called and spoke to patient.  Informed of echo and Holter monitor results.  She was very happy with the good news.

## 2024-05-20 NOTE — TELEPHONE ENCOUNTER
----- Message from Ethan Conroy MD sent at 5/20/2024  7:52 AM EDT -----  Pt's Holter shows no significant tachy or Faisal arrhthymias, few PACs and PVCs were noted.. Pt can keep his appointment. Please call patient.

## 2024-05-20 NOTE — TELEPHONE ENCOUNTER
----- Message from Ethan Conroy MD sent at 5/20/2024  7:52 AM EDT -----  Patient's echo shows normal LV systolic function.  No significant, to mild valvular disease.  Patient can keep an appointment.    Please call patient about echo report.

## 2024-05-20 NOTE — TELEPHONE ENCOUNTER
Called and spoke to patient.  Informed of both Echo and Holter monitor results.  She was very happy with the good news.

## 2024-06-12 ENCOUNTER — TELEPHONE (OUTPATIENT)
Dept: FAMILY MEDICINE CLINIC | Facility: CLINIC | Age: 59
End: 2024-06-12

## 2024-06-12 DIAGNOSIS — Z12.31 SCREENING MAMMOGRAM FOR HIGH-RISK PATIENT: ICD-10-CM

## 2024-06-13 ENCOUNTER — OFFICE VISIT (OUTPATIENT)
Dept: FAMILY MEDICINE CLINIC | Facility: CLINIC | Age: 59
End: 2024-06-13
Payer: COMMERCIAL

## 2024-06-13 VITALS
SYSTOLIC BLOOD PRESSURE: 106 MMHG | WEIGHT: 156.8 LBS | HEART RATE: 72 BPM | BODY MASS INDEX: 26.12 KG/M2 | HEIGHT: 65 IN | RESPIRATION RATE: 18 BRPM | TEMPERATURE: 96.9 F | DIASTOLIC BLOOD PRESSURE: 70 MMHG

## 2024-06-13 DIAGNOSIS — B34.9 VIRAL ILLNESS: Primary | ICD-10-CM

## 2024-06-13 LAB
S PYO AG THROAT QL: NEGATIVE
SARS-COV-2 AG UPPER RESP QL IA: NEGATIVE
VALID CONTROL: NORMAL

## 2024-06-13 PROCEDURE — 99213 OFFICE O/P EST LOW 20 MIN: CPT | Performed by: NURSE PRACTITIONER

## 2024-06-13 PROCEDURE — 87811 SARS-COV-2 COVID19 W/OPTIC: CPT | Performed by: NURSE PRACTITIONER

## 2024-06-13 PROCEDURE — 87880 STREP A ASSAY W/OPTIC: CPT | Performed by: NURSE PRACTITIONER

## 2024-06-13 NOTE — PROGRESS NOTES
"Assessment/Plan:    1. Viral illness  -     POCT Rapid Covid Ag  -     POCT rapid ANTIGEN strepA        Patient Instructions:  Supportive care for viral illness discussed and advised. will follow up for any worsening and no improvement  Gargle with warm salt water for 5 minutes every 4 hours.  Drink plenty of fluids at least 6 glasses of water a day.  Can use some honey lemon tea.   Call or follow up if symptoms are not better in 7 days.  Increase fluid intake, saline nasal rinses, and hot tea with honey and lemon.   Cool air humidification can be helpful as well.   May take Tylenol as needed for pain or fevers.    Mucinex D for sinus congestion or Coricidin HBP if you have high blood pressure or a heart condition.    Mucinex or Robitussin DM are effective for cough and chest congestion.    Supportive care discussed and advised.  Advised to RTO for any worsening and no improvement.   Follow up for no improvement and worsening of conditions.  Patient advised and educated when to see immediate medical care.    Return if symptoms worsen or fail to improve.      No future appointments.        Subjective:      Patient ID: Josephine Murcia is a 59 y.o. female.    Chief Complaint   Patient presents with   • Sore Throat     Hard to swallow; started yesterday  YC         Vitals:  /70   Pulse 72   Temp (!) 96.9 °F (36.1 °C) (Tympanic)   Resp 18   Ht 5' 5\" (1.651 m)   Wt 71.1 kg (156 lb 12.8 oz)   BMI 26.09 kg/m²     Patient stated that started with sore throat yesterday and having post nasal drip and did noticed white spots on back of throat this morning. Denies fever, chills and sob.  Not taking any OTC medications for UTI    Sore Throat   Pertinent negatives include no abdominal pain, congestion, coughing, diarrhea, drooling, ear discharge, ear pain, headaches, shortness of breath, trouble swallowing or vomiting.           The following portions of the patient's history were reviewed and updated as appropriate: " allergies, current medications, past family history, past medical history, past social history, past surgical history and problem list.      Review of Systems   Constitutional:  Negative for chills, diaphoresis, fatigue, fever and unexpected weight change.   HENT:  Positive for postnasal drip and sore throat. Negative for congestion, dental problem, drooling, ear discharge, ear pain, facial swelling, hearing loss, mouth sores, nosebleeds, rhinorrhea, sinus pressure, sinus pain, sneezing, tinnitus, trouble swallowing and voice change.    Respiratory:  Negative for cough, chest tightness, shortness of breath and wheezing.    Cardiovascular: Negative.    Gastrointestinal:  Negative for abdominal pain, constipation, diarrhea, nausea and vomiting.   Skin: Negative.    Neurological:  Negative for dizziness, light-headedness and headaches.         Objective:    Social History     Tobacco Use   Smoking Status Every Day   • Current packs/day: 0.50   • Types: Cigarettes   • Passive exposure: Never   Smokeless Tobacco Never       Allergies:   Allergies   Allergen Reactions   • Keflex [Cephalexin] Anaphylaxis   • Penicillins Facial Swelling   • Aspirin Other (See Comments)     Unknown - childhood   • Latex Rash   • Other Rash     Nitrile         Current Outpatient Medications   Medication Sig Dispense Refill   • EPINEPHrine (EPIPEN) 0.3 mg/0.3 mL SOAJ Inject 0.3 mL (0.3 mg total) into a muscle once for 1 dose As needed for anaphylaxis, proceed to ER 2 each 1   • FLUoxetine (PROzac) 10 MG tablet Take 0.5 tablets (5 mg total) by mouth daily Pt given wean instructions 30 tablet 5   • omeprazole (PriLOSEC) 20 mg delayed release capsule Take 20 mg by mouth daily     • rosuvastatin (CRESTOR) 10 MG tablet Take 1 tablet (10 mg total) by mouth daily 90 tablet 3   • ergocalciferol (ERGOCALCIFEROL) 1.25 MG (31347 UT) capsule Take 1 capsule (50,000 Units total) by mouth once a week (Patient not taking: Reported on 6/13/2024) 8 capsule 0    • mometasone (ELOCON) 0.1 % cream Apply topically daily (Patient not taking: Reported on 6/13/2024) 45 g 0   • predniSONE 20 mg tablet 4 tabs for three days, 3 tabs for three days, 2 tabs for three days, 1 tab for three days, 1/2 tab for 4 days (Patient not taking: Reported on 6/13/2024) 32 tablet 0   • tobramycin-dexamethasone (TOBRADEX) ophthalmic suspension  (Patient not taking: Reported on 6/13/2024)       No current facility-administered medications for this visit.          Physical Exam  Vitals reviewed.   Constitutional:       Appearance: Normal appearance. She is well-developed.   HENT:      Head: Normocephalic.      Comments: Post nasal drip noted     Right Ear: Tympanic membrane, ear canal and external ear normal.      Left Ear: Tympanic membrane, ear canal and external ear normal.      Nose: Nose normal.      Right Sinus: No maxillary sinus tenderness or frontal sinus tenderness.      Left Sinus: No maxillary sinus tenderness or frontal sinus tenderness.      Mouth/Throat:      Mouth: No oral lesions.      Pharynx: No oropharyngeal exudate or posterior oropharyngeal erythema.   Cardiovascular:      Rate and Rhythm: Normal rate and regular rhythm.      Heart sounds: Normal heart sounds.   Pulmonary:      Effort: Pulmonary effort is normal.      Breath sounds: Normal breath sounds.   Musculoskeletal:         General: Normal range of motion.      Cervical back: Neck supple.   Lymphadenopathy:      Cervical:      Right cervical: No superficial or posterior cervical adenopathy.     Left cervical: No superficial or posterior cervical adenopathy.   Skin:     General: Skin is warm and dry.   Neurological:      Mental Status: She is alert and oriented to person, place, and time.   Psychiatric:         Behavior: Behavior normal.         Thought Content: Thought content normal.         Judgment: Judgment normal.             Recent Results (from the past 24 hour(s))   POCT Rapid Covid Ag    Collection Time:  06/13/24  1:07 PM   Result Value Ref Range    POCT SARS-CoV-2 Ag Negative Negative    VALID CONTROL Valid    POCT rapid ANTIGEN strepA    Collection Time: 06/13/24  1:07 PM   Result Value Ref Range     RAPID STREP A Negative Negative               LOLY Chen

## 2024-08-17 DIAGNOSIS — E78.2 MIXED HYPERLIPIDEMIA: ICD-10-CM

## 2024-08-19 RX ORDER — ROSUVASTATIN CALCIUM 10 MG/1
10 TABLET, COATED ORAL DAILY
Qty: 90 TABLET | Refills: 3 | Status: SHIPPED | OUTPATIENT
Start: 2024-08-19

## 2024-12-12 DIAGNOSIS — F41.9 ANXIETY: ICD-10-CM

## 2024-12-13 RX ORDER — FLUOXETINE 10 MG/1
TABLET, FILM COATED ORAL
Qty: 30 TABLET | Refills: 5 | Status: SHIPPED | OUTPATIENT
Start: 2024-12-13

## 2025-01-28 DIAGNOSIS — F41.9 ANXIETY: ICD-10-CM

## 2025-01-29 RX ORDER — FLUOXETINE 10 MG/1
10 CAPSULE ORAL DAILY
Qty: 30 CAPSULE | Refills: 0 | Status: SHIPPED | OUTPATIENT
Start: 2025-01-29

## 2025-03-13 ENCOUNTER — OFFICE VISIT (OUTPATIENT)
Dept: FAMILY MEDICINE CLINIC | Facility: CLINIC | Age: 60
End: 2025-03-13
Payer: COMMERCIAL

## 2025-03-13 VITALS
TEMPERATURE: 97.9 F | HEIGHT: 65 IN | HEART RATE: 88 BPM | WEIGHT: 165.8 LBS | RESPIRATION RATE: 18 BRPM | DIASTOLIC BLOOD PRESSURE: 78 MMHG | BODY MASS INDEX: 27.63 KG/M2 | SYSTOLIC BLOOD PRESSURE: 130 MMHG

## 2025-03-13 DIAGNOSIS — R10.31 RIGHT LOWER QUADRANT ABDOMINAL PAIN: Primary | ICD-10-CM

## 2025-03-13 DIAGNOSIS — E78.2 MIXED HYPERLIPIDEMIA: ICD-10-CM

## 2025-03-13 DIAGNOSIS — F41.9 ANXIETY: ICD-10-CM

## 2025-03-13 DIAGNOSIS — Z12.31 ENCOUNTER FOR SCREENING MAMMOGRAM FOR HIGH-RISK PATIENT: ICD-10-CM

## 2025-03-13 DIAGNOSIS — E55.9 VITAMIN D DEFICIENCY: ICD-10-CM

## 2025-03-13 DIAGNOSIS — N39.0 URINARY TRACT INFECTION WITHOUT HEMATURIA, SITE UNSPECIFIED: ICD-10-CM

## 2025-03-13 DIAGNOSIS — R73.09 ABNORMAL GLUCOSE: ICD-10-CM

## 2025-03-13 DIAGNOSIS — Z78.0 POST-MENOPAUSAL: ICD-10-CM

## 2025-03-13 LAB
SL AMB  POCT GLUCOSE, UA: NEGATIVE
SL AMB LEUKOCYTE ESTERASE,UA: NORMAL
SL AMB POCT BILIRUBIN,UA: NEGATIVE
SL AMB POCT BLOOD,UA: NEGATIVE
SL AMB POCT CLARITY,UA: CLEAR
SL AMB POCT COLOR,UA: NORMAL
SL AMB POCT KETONES,UA: NEGATIVE
SL AMB POCT NITRITE,UA: NEGATIVE
SL AMB POCT PH,UA: 7
SL AMB POCT SPECIFIC GRAVITY,UA: 1.02
SL AMB POCT URINE PROTEIN: NEGATIVE
SL AMB POCT UROBILINOGEN: NORMAL

## 2025-03-13 PROCEDURE — 87086 URINE CULTURE/COLONY COUNT: CPT | Performed by: FAMILY MEDICINE

## 2025-03-13 PROCEDURE — 99214 OFFICE O/P EST MOD 30 MIN: CPT | Performed by: FAMILY MEDICINE

## 2025-03-13 PROCEDURE — 81003 URINALYSIS AUTO W/O SCOPE: CPT | Performed by: FAMILY MEDICINE

## 2025-03-13 RX ORDER — SULFAMETHOXAZOLE AND TRIMETHOPRIM 800; 160 MG/1; MG/1
1 TABLET ORAL EVERY 12 HOURS SCHEDULED
Qty: 6 TABLET | Refills: 0 | Status: SHIPPED | OUTPATIENT
Start: 2025-03-13 | End: 2025-03-16

## 2025-03-13 RX ORDER — FLUOXETINE 10 MG/1
10 TABLET, FILM COATED ORAL DAILY
Qty: 90 TABLET | Refills: 0 | Status: SHIPPED | OUTPATIENT
Start: 2025-03-13

## 2025-03-13 RX ORDER — PHENAZOPYRIDINE HYDROCHLORIDE 200 MG/1
200 TABLET, FILM COATED ORAL
Qty: 10 TABLET | Refills: 0 | Status: SHIPPED | OUTPATIENT
Start: 2025-03-13

## 2025-03-13 NOTE — PROGRESS NOTES
"Name: Josephine Murcia      : 1965      MRN: [id number]  Encounter Provider: Frank Lombardi, DO  Encounter Date: 3/13/2025   Encounter department: [facility]  :  Assessment & Plan  Right lower quadrant abdominal pain    Orders:    POCT urine dip auto non-scope    CBC; Future    Abnormal glucose  A1c ordered  Orders:    CBC; Future    Comprehensive metabolic panel; Future    Hemoglobin A1C; Future    Mixed hyperlipidemia  Follow lipids  Orders:    CBC; Future    Comprehensive metabolic panel; Future    Lipid Panel with Direct LDL reflex; Future    Vitamin D deficiency  Will follow vit d  Orders:    CBC; Future    Vitamin D 25 hydroxy; Future    Encounter for screening mammogram for high-risk patient    Orders:    Mammo screening bilateral w 3d and cad; Future    Post-menopausal    Orders:    DXA bone density spine hip and pelvis; Future    Urinary tract infection without hematuria, site unspecified  Seems like her abd pain is secondary to a UTI  Orders:    sulfamethoxazole-trimethoprim (BACTRIM DS) 800-160 mg per tablet; Take 1 tablet by mouth every 12 (twelve) hours for 3 days    phenazopyridine (PYRIDIUM) 200 mg tablet; Take 1 tablet (200 mg total) by mouth 3 (three) times a day with meals    Urine culture    Anxiety    Orders:    FLUoxetine 10 MG tablet; Take 1 tablet (10 mg total) by mouth daily           History of Present Illness   Pt states for two weeks sha ehas been having rt lower abd pain  States her bowels have been off , she has been gassy  States the pain rad to her back on the rt siode as well  No fever  Pt states she has had to push out her urine for the last two days    Abdominal Pain      Review of Systems   Gastrointestinal:  Positive for abdominal pain.   Genitourinary:  Positive for difficulty urinating.       Objective   /78   Pulse 88   Temp 97.9 °F (36.6 °C)   Resp 18   Ht 5' 5\" (1.651 m)   Wt 75.2 kg (165 lb 12.8 oz)   BMI 27.59 kg/m²      Physical " Exam  Abdominal:      Tenderness: There is abdominal tenderness in the right lower quadrant. There is no right CVA tenderness, left CVA tenderness, guarding or rebound.

## 2025-03-13 NOTE — ASSESSMENT & PLAN NOTE
Follow lipids  Orders:    CBC; Future    Comprehensive metabolic panel; Future    Lipid Panel with Direct LDL reflex; Future

## 2025-03-13 NOTE — ASSESSMENT & PLAN NOTE
A1c ordered  Orders:    CBC; Future    Comprehensive metabolic panel; Future    Hemoglobin A1C; Future

## 2025-03-14 ENCOUNTER — RESULTS FOLLOW-UP (OUTPATIENT)
Dept: FAMILY MEDICINE CLINIC | Facility: CLINIC | Age: 60
End: 2025-03-14

## 2025-03-14 ENCOUNTER — TELEPHONE (OUTPATIENT)
Age: 60
End: 2025-03-14

## 2025-03-14 LAB — BACTERIA UR CULT: NORMAL

## 2025-03-14 RX ORDER — FLUOXETINE 10 MG/1
10 CAPSULE ORAL DAILY
Qty: 30 CAPSULE | Refills: 0 | OUTPATIENT
Start: 2025-03-14

## 2025-03-14 NOTE — TELEPHONE ENCOUNTER
Pt called in to see if Dr. Lombardi had the results in for her urinalysis. While speaking to the patient on the phone she received something via [employer] and was able to see the results. NFA at this time

## 2025-03-18 ENCOUNTER — OFFICE VISIT (OUTPATIENT)
Dept: FAMILY MEDICINE CLINIC | Facility: CLINIC | Age: 60
End: 2025-03-18
Payer: COMMERCIAL

## 2025-03-18 VITALS
RESPIRATION RATE: 16 BRPM | HEART RATE: 96 BPM | WEIGHT: 165 LBS | SYSTOLIC BLOOD PRESSURE: 104 MMHG | BODY MASS INDEX: 27.49 KG/M2 | TEMPERATURE: 97.2 F | DIASTOLIC BLOOD PRESSURE: 62 MMHG | HEIGHT: 65 IN

## 2025-03-18 DIAGNOSIS — R10.2 PELVIC PAIN: Primary | ICD-10-CM

## 2025-03-18 PROCEDURE — 99213 OFFICE O/P EST LOW 20 MIN: CPT | Performed by: FAMILY MEDICINE

## 2025-03-18 NOTE — PROGRESS NOTES
"Name: Josephine Murcia      : 1965      MRN: [id number]  Encounter Provider: Frank Lombardi, DO  Encounter Date: 3/18/2025   Encounter department: [facility]  :  Assessment & Plan  Pelvic pain  Pt states she has a history of kidney stones  Tenderness groin on the rt side  Poss node plapted in rt grroin    Orders:    CT renal stone study abdomen pelvis wo contrast; Future    US pelvis complete w transvaginal; Future           History of Present Illness   Pt is sched for a follow up  PT states overall she feels better  Pt states she was palpating her groin and states she feels a lump in her groin.        Review of Systems   Genitourinary:         Pelvic pain  Bump in groin       Objective   /62   Pulse 96   Temp (!) 97.2 °F (36.2 °C)   Resp 16   Ht 5' 5\" (1.651 m)   Wt 74.8 kg (165 lb)   BMI 27.46 kg/m²      Physical Exam  Genitourinary:     Comments: Tender in rt groin  Pt has also what feels like a bump in her rt groin - freely mobile          "

## 2025-03-21 ENCOUNTER — HOSPITAL ENCOUNTER (OUTPATIENT)
Dept: RADIOLOGY | Facility: HOSPITAL | Age: 60
Discharge: HOME | End: 2025-03-21
Attending: FAMILY MEDICINE
Payer: COMMERCIAL

## 2025-03-21 DIAGNOSIS — R10.2 PELVIC PAIN: ICD-10-CM

## 2025-03-21 PROCEDURE — 76856 US EXAM PELVIC COMPLETE: CPT

## 2025-03-21 PROCEDURE — 76830 TRANSVAGINAL US NON-OB: CPT

## 2025-03-25 ENCOUNTER — TELEPHONE (OUTPATIENT)
Dept: FAMILY MEDICINE CLINIC | Facility: CLINIC | Age: 60
End: 2025-03-25

## 2025-03-25 DIAGNOSIS — F41.9 ANXIETY: Primary | ICD-10-CM

## 2025-03-25 NOTE — TELEPHONE ENCOUNTER
Called patient, she said she doesn't have a reason other than the fact you ordered it last time and she never got it done. She said you can put whatever reason for the original order.  Corinne Dewitt MA

## 2025-03-25 NOTE — TELEPHONE ENCOUNTER
Patient is coming to office to  lab slips later today. Can you please order thyroid lab for patient? Last ordered for her on 3/20/2024, that order just  in system. She is unsure if she needs to use [facility] or [facility] - for now can you just order to [facility] lab? Once thyroid lab is placed, please print labs and place in patient

## 2025-03-26 NOTE — PROGRESS NOTES
Annual Exam Pre-charting    Last Annual Exam: 3/20/23    Last PAP/HPV Test and Result: 3/20/23     Normal    Last Mammo Screenin24    Last STD Culture Screening:    Current BC Method:

## 2025-03-27 ENCOUNTER — ANNUAL EXAM (OUTPATIENT)
Dept: OBGYN CLINIC | Facility: CLINIC | Age: 60
End: 2025-03-27
Payer: COMMERCIAL

## 2025-03-27 VITALS
WEIGHT: 166 LBS | SYSTOLIC BLOOD PRESSURE: 106 MMHG | HEIGHT: 65 IN | DIASTOLIC BLOOD PRESSURE: 64 MMHG | BODY MASS INDEX: 27.66 KG/M2

## 2025-03-27 DIAGNOSIS — Z12.4 SCREENING FOR CERVICAL CANCER: ICD-10-CM

## 2025-03-27 DIAGNOSIS — Z01.419 ENCOUNTER FOR GYNECOLOGICAL EXAMINATION (GENERAL) (ROUTINE) WITHOUT ABNORMAL FINDINGS: Primary | ICD-10-CM

## 2025-03-27 DIAGNOSIS — R10.2 PELVIC PAIN: ICD-10-CM

## 2025-03-27 PROCEDURE — 99396 PREV VISIT EST AGE 40-64: CPT | Performed by: PHYSICIAN ASSISTANT

## 2025-03-27 RX ORDER — LACTULOSE 10 G/15ML
SOLUTION ORAL
COMMUNITY
Start: 2025-03-19

## 2025-03-27 RX ORDER — OMEPRAZOLE 40 MG/1
CAPSULE, DELAYED RELEASE ORAL
COMMUNITY
Start: 2025-03-19 | End: 2025-03-27

## 2025-03-27 NOTE — PROGRESS NOTES
ASSESSMENT & PLAN: Josephine Murcia is a 60 y.o.  with normal gynecologic exam.    1.  Routine well woman exam done today  2.  Pap and HPV:  The patient's last pap and hpv was . It was normal.    Pap and cotesting was done today.    Current ASCCP Guidelines reviewed.   3.  Mammogram ordered by PCP - pt schedules through [facility].  4.  Pt established with GI group in [place] - will defer colon cancer screen recommendations to their office.   DEXA ordered by patient PCP.  Discussed reasoning for DEXA scan and counseled patient regarding osteoporosis.  Patient will consider.  5. The following were reviewed in today's visit: breast self exam, menopause, adequate intake of calcium and vitamin D, exercise, healthy diet, and age-appropriate recommendations regarding screenings and prevention.  6.  Patient noted a few weeks of intense right lower quadrant/right pelvic pain with no other significant relatable symptoms.  She did see PCP for which urine testing was ordered, Bactrim 3-day course prescribed for consideration of UTI however her culture was benign.  Patient had pelvic ultrasound ordered through PCP which results came through as we were at appointment today which I reviewed with her in detail and was very reassuring.  She will pursue further testing as ordered by PCP and also encourage follow-up through her gastroenterologist as she does note some intermittent issues with constipation which could have contributed to pain.  7.  Discussed vaginal dryness and atrophic changes at the vulvovaginal area.  Patient is not sexually active but does occasionally experience some dryness sensation in that area.  Discussed possibility of using coconut oil or vitamin E oil as a moisturizer applied to vulvovaginal area a few times a week before bed.  Patient thankful for recommendation.    RTO 1 year annual exam, sooner problems arise in the interim.        CC:  Annual Gynecologic Examination    HPI: Josephine Murcia  is a 60 y.o.  who presents for annual gynecologic examination.   Last appt .     She has the following concerns:  states had a few weeks of right lower abdominal pain/pelvic pain. Pain has since subsided. Was seen and evaluated by PCP and tx for UTI and culture was negative. Hx of IC and wasn't that. Sometimes pain wrapped around to back. States did have some hard stools that week. Also straining, thought pulled muscle.   No N/V, blood in stool or fever.  States also had a moveable lump In her groin/suprapubic area that was tender, but that also resolved.     States PCP ordered imaging, had pelvic ultrasound done about a week ago with results still pending.  Also they ordered CT renal stone protocol which is scheduled.  Also her GI doctor ordered CT abdomen and pelvis.    Pre-existing conditions include IC, GERD/Pulido's esophagus, interstitial cystitis, cervical radiculopathy, anxiety, tobacco use, vitamin D deficiency and hyperlipidemia.      No LMP recorded. Patient is postmenopausal.      Post-menopausal bleeding: none    Bowel or bladder changes: Occasional constipation.  Denies any acute UTI symptoms at present.  Denies hematuria, painful urination or significant urinary leakage.      Health Maintenance:      She does perform regular monthly self breast exams.  History of breast biopsy with clip left side.  Denies new breast pain, lump, skin change or nipple discharge.    She feels safe at home.     Last Pap:   Last mammogram: 2024  Last colonoscopy: established with [facility] ([place]) and has colonoscopy planned.     Past Medical History:   Diagnosis Date    Anxiety     Chronic sinusitis     GERD (gastroesophageal reflux disease)        Past Surgical History:   Procedure Laterality Date    CERVICAL FUSION      HERNIA REPAIR Left     inquinal    OVARY SURGERY Left     oopherectomy     MS EXC B9 LESION MRGN XCP SK TG S/N/H/F/G 0.5 CM/< Right 2022    Procedure: EXCISION LIPOMA OF BACK;   Surgeon: Martinez Cervantes MD;  Location: [facility];  Service: General    TOE SURGERY         Past OB/Gyn History:  OB History          1    Para   1    Term   1            AB        Living   1         SAB        IAB        Ectopic        Multiple        Live Births                   History of sexually transmitted infection: hx HPV.  History of abnormal pap smears: Yes .    Patient is not currently sexually active.       Family History   Problem Relation Age of Onset    Hypertension Mother     COPD Mother     No Known Problems Father     Diabetes Brother     Breast cancer Maternal Grandmother     Breast cancer Paternal Grandmother     Breast cancer Cousin     Mental illness Neg Hx        Family history of Breast/Uterine/Ovarian/Colon Cancer: as above    Social History:  Social History     Socioeconomic History    Marital status: Single     Spouse name: Not on file    Number of children: Not on file    Years of education: Not on file    Highest education level: Not on file   Occupational History    Not on file   Tobacco Use    Smoking status: Every Day     Current packs/day: 0.50     Types: Cigarettes     Passive exposure: Never    Smokeless tobacco: Never   Vaping Use    Vaping status: Never Used   Substance and Sexual Activity    Alcohol use: Not Currently     Comment: none    Drug use: Never    Sexual activity: Not Currently   Other Topics Concern    Not on file   Social History Narrative    Not on file     Social Drivers of Health     Financial Resource Strain: Not on file   Food Insecurity: Not on file   Transportation Needs: Not on file   Physical Activity: Not on file   Stress: Not on file   Social Connections: Not on file   Intimate Partner Violence: Not on file   Housing Stability: Not on file       Allergies   Allergen Reactions    Keflex [Cephalexin] Anaphylaxis    Penicillins Facial Swelling    Aspirin Other (See Comments)     Unknown - childhood    Latex Rash    Other Rash     Nitrile        Current Outpatient Medications:     Constulose 10 GM/15ML solution, , Disp: , Rfl:     FLUoxetine 10 MG tablet, Take 1 tablet (10 mg total) by mouth daily, Disp: 90 tablet, Rfl: 0    omeprazole (PriLOSEC) 20 mg delayed release capsule, Take 20 mg by mouth daily, Disp: , Rfl:     rosuvastatin (CRESTOR) 10 MG tablet, TAKE ONE TABLET BY MOUTH EVERY DAY, Disp: 90 tablet, Rfl: 3    EPINEPHrine (EPIPEN) 0.3 mg/0.3 mL SOAJ, Inject 0.3 mL (0.3 mg total) into a muscle once for 1 dose As needed for anaphylaxis, proceed to ER, Disp: 2 each, Rfl: 1    tobramycin-dexamethasone (TOBRADEX) ophthalmic suspension, , Disp: , Rfl:       Review of Systems  Constitutional :no fever, feels well, no tiredness, no recent weight gain or loss  ENT: no ear ache, no loss of hearing, no nosebleeds or nasal discharge, no sore throat or hoarseness.  Cardiovascular: no complaints of slow or fast heart beat, no chest pain, no palpitations, no leg claudication or lower extremity edema.  Respiratory: no complaints of shortness of shortness of breath, no MCLEAN  Breasts:no complaints of breast pain, breast lump, or nipple discharge  Gastrointestinal: occasional constipation.  Recent right lower abdominal pain as in HPI.  No complaints of  nausea, vomiting, or diarrhea or bloody stools  Genitourinary : right sided abd/pelvic pain x a few weeks, since resolved. Occasional mild vulvovaginal dryness. no complaints of dysuria, incontinence, vaginal discharge/itching/odor or menopausal bleeding.    Musculoskeletal: no complaints of arthralgia, no myalgia, no joint swelling or stiffness, no limb pain or swelling.  Integumentary: no complaints of skin rash or lesion, itching or dry skin  Neurological: no complaints of worsening headache, no confusion, no numbness or tingling, no dizziness or fainting.  Mental health: Denies worsening anxiety or depression symptoms.    Objective      /64 (BP Location: Left arm, Patient Position: Sitting, Cuff Size:  "Adult)   Ht 5' 5\" (1.651 m)   Wt 75.3 kg (166 lb)   BMI 27.62 kg/m²     General:   appears stated age, cooperative, alert normal mood and affect.  BMI 27.62   Neck: normal, supple,trachea midline, no masses   Heart: regular rate and rhythm, S1, S2 normal, no murmur, click, rub or gallop   Lungs: clear to auscultation bilaterally   Breasts: normal appearance, no masses or tenderness, No nipple retraction or dimpling, No nipple discharge or bleeding, No axillary or supraclavicular adenopathy, Normal to palpation without dominant masses   Abdomen: soft, non-tender, without masses or organomegaly   Vulva: normal female genitalia, no lesions with exception to mild atrophic change postmenopausally.   Vagina: normal vagina, no discharge, exudate, lesion, or erythema   Urethra: normal   Cervix: Normal, no discharge. PAP done. Nontender.   Uterus: normal size, contour, position, consistency, mobility, non-tender   Adnexa: no mass, fullness, tenderness   Lymphatic palpation of lymph nodes in neck, axilla, groin and/or other locations: no lymphadenopathy or masses noted   Skin normal skin turgor and no rashes.   Psychiatric orientation to person, place, and time: normal. mood and affect: normal           "

## 2025-03-28 ENCOUNTER — HOSPITAL ENCOUNTER (OUTPATIENT)
Dept: RADIOLOGY | Facility: HOSPITAL | Age: 60
Discharge: HOME | End: 2025-03-28
Attending: FAMILY MEDICINE
Payer: COMMERCIAL

## 2025-03-28 DIAGNOSIS — R10.2 PELVIC PAIN: ICD-10-CM

## 2025-03-28 PROCEDURE — 74176 CT ABD & PELVIS W/O CONTRAST: CPT

## 2025-04-05 LAB
CYTOLOGIST CVX/VAG CYTO: ABNORMAL
DX ICD CODE: ABNORMAL
HPV GENOTYPE REFLEX: ABNORMAL
HPV I/H RISK 4 DNA CVX QL PROBE+SIG AMP: POSITIVE
HPV16 DNA CVX QL PROBE+SIG AMP: NEGATIVE
HPV18+45 E6+E7 MRNA CVX QL NAA+PROBE: NEGATIVE
OTHER STN SPEC: ABNORMAL
PATH REPORT.FINAL DX SPEC: ABNORMAL
SL AMB NOTE:: ABNORMAL
SL AMB SPECIMEN ADEQUACY: ABNORMAL
SL AMB TEST METHODOLOGY: ABNORMAL

## 2025-04-07 ENCOUNTER — RESULTS FOLLOW-UP (OUTPATIENT)
Dept: OBGYN CLINIC | Facility: CLINIC | Age: 60
End: 2025-04-07

## 2025-04-08 ENCOUNTER — RESULTS FOLLOW-UP (OUTPATIENT)
Dept: FAMILY MEDICINE CLINIC | Facility: CLINIC | Age: 60
End: 2025-04-08

## 2025-04-08 PROBLEM — N80.03 ADENOMYOSIS OF THE UTERUS: Status: ACTIVE | Noted: 2025-04-08

## 2025-04-08 NOTE — TELEPHONE ENCOUNTER
Called patient to discuss results of the CT scan as well as the pelvic ultrasound.  Patient has adenomyosis of the uterus patient was advised with this is could cause cramping heavy periods etc. I do not think this is the etiology of the pain that she was show experiencing.  Patient was advised that this can be surgically removed however my suggestion for her currently would be to engage in physical therapy to see if we can pinpoint the pain being more of a musculoskeletal complaint as opposed to a visceral complaint.  Patient is aware of my suggestions.  I also discussed the CT scan which did show a nonobstructing left-sided kidney stone she is not having any symptoms on that side.

## 2025-04-19 DIAGNOSIS — T78.2XXA ANAPHYLAXIS, INITIAL ENCOUNTER: ICD-10-CM

## 2025-04-19 RX ORDER — EPINEPHRINE 0.3 MG/.3ML
INJECTION SUBCUTANEOUS
Qty: 2 EACH | Refills: 1 | Status: SHIPPED | OUTPATIENT
Start: 2025-04-19

## 2025-05-21 DIAGNOSIS — Z12.31 ENCOUNTER FOR SCREENING MAMMOGRAM FOR HIGH-RISK PATIENT: Primary | ICD-10-CM

## 2025-06-21 LAB
25(OH)D3+25(OH)D2 SERPL-MCNC: 20.3 NG/ML (ref 30–100)
ALBUMIN SERPL-MCNC: 4.7 G/DL (ref 3.8–4.9)
ALP SERPL-CCNC: 62 IU/L (ref 44–121)
ALT SERPL-CCNC: 21 IU/L (ref 0–32)
AST SERPL-CCNC: 15 IU/L (ref 0–40)
BASOPHILS # BLD AUTO: 0.1 X10E3/UL (ref 0–0.2)
BASOPHILS NFR BLD AUTO: 1 %
BILIRUB SERPL-MCNC: 0.3 MG/DL (ref 0–1.2)
BUN SERPL-MCNC: 8 MG/DL (ref 6–24)
BUN/CREAT SERPL: 9 (ref 9–23)
CALCIUM SERPL-MCNC: 10.2 MG/DL (ref 8.7–10.2)
CHLORIDE SERPL-SCNC: 99 MMOL/L (ref 96–106)
CHOLEST SERPL-MCNC: 236 MG/DL (ref 100–199)
CO2 SERPL-SCNC: 21 MMOL/L (ref 20–29)
CREAT SERPL-MCNC: 0.92 MG/DL (ref 0.57–1)
EGFR: 72 ML/MIN/1.73
EOSINOPHIL # BLD AUTO: 0.2 X10E3/UL (ref 0–0.4)
EOSINOPHIL NFR BLD AUTO: 2 %
ERYTHROCYTE [DISTWIDTH] IN BLOOD BY AUTOMATED COUNT: 14.3 % (ref 11.7–15.4)
GLOBULIN SER-MCNC: 2.2 G/DL (ref 1.5–4.5)
GLUCOSE SERPL-MCNC: 112 MG/DL (ref 70–99)
HBA1C MFR BLD: 5.8 % (ref 4.8–5.6)
HCT VFR BLD AUTO: 44.9 % (ref 34–46.6)
HDLC SERPL-MCNC: 68 MG/DL
HGB BLD-MCNC: 14.4 G/DL (ref 11.1–15.9)
IMM GRANULOCYTES # BLD: 0 X10E3/UL (ref 0–0.1)
IMM GRANULOCYTES NFR BLD: 0 %
LDLC SERPL CALC-MCNC: 155 MG/DL (ref 0–99)
LDLC/HDLC SERPL: 2.3 RATIO (ref 0–3.2)
LYMPHOCYTES # BLD AUTO: 3 X10E3/UL (ref 0.7–3.1)
LYMPHOCYTES NFR BLD AUTO: 42 %
MCH RBC QN AUTO: 28.5 PG (ref 26.6–33)
MCHC RBC AUTO-ENTMCNC: 32.1 G/DL (ref 31.5–35.7)
MCV RBC AUTO: 89 FL (ref 79–97)
MICRODELETION SYND BLD/T FISH: NORMAL
MONOCYTES # BLD AUTO: 0.7 X10E3/UL (ref 0.1–0.9)
MONOCYTES NFR BLD AUTO: 10 %
NEUTROPHILS # BLD AUTO: 3.1 X10E3/UL (ref 1.4–7)
NEUTROPHILS NFR BLD AUTO: 45 %
PLATELET # BLD AUTO: 418 X10E3/UL (ref 150–450)
POTASSIUM SERPL-SCNC: 5.3 MMOL/L (ref 3.5–5.2)
PROT SERPL-MCNC: 6.9 G/DL (ref 6–8.5)
RBC # BLD AUTO: 5.06 X10E6/UL (ref 3.77–5.28)
SL AMB VLDL CHOLESTEROL CALC: 13 MG/DL (ref 5–40)
SODIUM SERPL-SCNC: 135 MMOL/L (ref 134–144)
TRIGL SERPL-MCNC: 77 MG/DL (ref 0–149)
TSH SERPL DL<=0.005 MIU/L-ACNC: 6.84 UIU/ML (ref 0.45–4.5)
WBC # BLD AUTO: 7.1 X10E3/UL (ref 3.4–10.8)

## 2025-06-23 ENCOUNTER — RESULTS FOLLOW-UP (OUTPATIENT)
Dept: FAMILY MEDICINE CLINIC | Facility: CLINIC | Age: 60
End: 2025-06-23

## 2025-06-30 DIAGNOSIS — F41.9 ANXIETY: ICD-10-CM

## 2025-07-02 RX ORDER — FLUOXETINE 10 MG/1
10 TABLET, FILM COATED ORAL DAILY
Qty: 90 TABLET | Refills: 1 | Status: SHIPPED | OUTPATIENT
Start: 2025-07-02

## 2025-07-16 ENCOUNTER — OFFICE VISIT (OUTPATIENT)
Dept: FAMILY MEDICINE CLINIC | Facility: CLINIC | Age: 60
End: 2025-07-16
Payer: COMMERCIAL

## 2025-07-16 VITALS
RESPIRATION RATE: 18 BRPM | WEIGHT: 168 LBS | HEIGHT: 65 IN | SYSTOLIC BLOOD PRESSURE: 100 MMHG | TEMPERATURE: 96.7 F | BODY MASS INDEX: 27.99 KG/M2 | HEART RATE: 68 BPM | DIASTOLIC BLOOD PRESSURE: 68 MMHG

## 2025-07-16 DIAGNOSIS — E78.2 MIXED HYPERLIPIDEMIA: ICD-10-CM

## 2025-07-16 DIAGNOSIS — R79.89 ABNORMAL TSH: ICD-10-CM

## 2025-07-16 DIAGNOSIS — Z00.00 ANNUAL PHYSICAL EXAM: Primary | ICD-10-CM

## 2025-07-16 DIAGNOSIS — R73.03 PREDIABETES: ICD-10-CM

## 2025-07-16 DIAGNOSIS — E55.9 VITAMIN D DEFICIENCY: ICD-10-CM

## 2025-07-16 DIAGNOSIS — K42.9 UMBILICAL HERNIA WITHOUT OBSTRUCTION AND WITHOUT GANGRENE: ICD-10-CM

## 2025-07-16 PROCEDURE — 99396 PREV VISIT EST AGE 40-64: CPT | Performed by: FAMILY MEDICINE

## 2025-07-16 RX ORDER — SULFAMETHOXAZOLE AND TRIMETHOPRIM 800; 160 MG/1; MG/1
TABLET ORAL
COMMUNITY

## 2025-07-16 RX ORDER — ERGOCALCIFEROL 1.25 MG/1
50000 CAPSULE ORAL WEEKLY
Qty: 8 CAPSULE | Refills: 0 | Status: SHIPPED | OUTPATIENT
Start: 2025-07-16

## 2025-07-16 RX ORDER — ATORVASTATIN CALCIUM 10 MG/1
TABLET, FILM COATED ORAL
COMMUNITY

## 2025-07-16 RX ORDER — OXYCODONE AND ACETAMINOPHEN 5; 325 MG/1; MG/1
TABLET ORAL
COMMUNITY

## 2025-07-16 NOTE — ASSESSMENT & PLAN NOTE
Pt states she has not been super compliant with the crestro - has been better the last mionth  Orders:  •  Comprehensive metabolic panel; Future  •  Lipid Panel with Direct LDL reflex; Future

## 2025-07-16 NOTE — ASSESSMENT & PLAN NOTE
Orders:  •  ergocalciferol (ERGOCALCIFEROL) 1.25 MG (74906 UT) capsule; Take 1 capsule (50,000 Units total) by mouth once a week

## 2025-07-16 NOTE — PATIENT INSTRUCTIONS
"Patient Education     Routine physical for adults   The Basics   Written by the doctors and editors at [facility], [place]   What is a physical? -- A physical is a routine visit, or \"check-up,\" with your doctor. You might also hear it called a \"wellness visit\" or \"preventive visit.\"  During each visit, the doctor will:   Ask about your physical and mental health   Ask about your habits, behaviors, and lifestyle   Do an exam   Give you vaccines if needed   Talk to you about any medicines you take   Give advice about your health   Answer your questions  Getting regular check-ups is an important part of taking care of your health. It can help your doctor find and treat any problems you have. But it's also important for preventing health problems.  A routine physical is different from a \"sick visit.\" A sick visit is when you see a doctor because of a health concern or problem. Since physicals are scheduled ahead of time, you can think about what you want to ask the doctor.  How often should I get a physical? -- It depends on your age and health. In general, for people age 21 years and older:   If you are younger than 50 years, you might be able to get a physical every 3 years.   If you are 50 years or older, your doctor might recommend a physical every year.  If you have an ongoing health condition, like diabetes or high blood pressure, your doctor will probably want to see you more often.  What happens during a physical? -- In general, each visit will include:   Physical exam - The doctor or nurse will check your height, weight, heart rate, and blood pressure. They will also look at your eyes and ears. They will ask about how you are feeling and whether you have any symptoms that bother you.   Medicines - It's a good idea to bring a list of all the medicines you take to each doctor visit. Your doctor will talk to you about your medicines and answer any questions. Tell them if you are having any side effects that bother you. You " "should also tell them if you are having trouble paying for any of your medicines.   Habits and behaviors - This includes:   Your diet   Your exercise habits   Whether you smoke, drink alcohol, or use drugs   Whether you are sexually active   Whether you feel safe at home  Your doctor will talk to you about things you can do to improve your health and lower your risk of health problems. They will also offer help and support. For example, if you want to quit smoking, they can give you advice and might prescribe medicines. If you want to improve your diet or get more physical activity, they can help you with this, too.   Lab tests, if needed - The tests you get will depend on your age and situation. For example, your doctor might want to check your:   Cholesterol   Blood sugar   Iron level   Vaccines - The recommended vaccines will depend on your age, health, and what vaccines you already had. Vaccines are very important because they can prevent certain serious or deadly infections.   Discussion of screening - \"Screening\" means checking for diseases or other health problems before they cause symptoms. Your doctor can recommend screening based on your age, risk, and preferences. This might include tests to check for:   Cancer, such as breast, prostate, cervical, ovarian, colorectal, prostate, lung, or skin cancer   Sexually transmitted infections, such as chlamydia and gonorrhea   Mental health conditions like depression and anxiety  Your doctor will talk to you about the different types of screening tests. They can help you decide which screenings to have. They can also explain what the results might mean.   Answering questions - The physical is a good time to ask the doctor or nurse questions about your health. If needed, they can refer you to other doctors or specialists, too.  Adults older than 65 years often need other care, too. As you get older, your doctor will talk to you about:   How to prevent falling at " home   Hearing or vision tests   Memory testing   How to take your medicines safely   Making sure that you have the help and support you need at home  All topics are updated as new evidence becomes available and our peer review process is complete.  This topic retrieved from [employer] on: May 02, 2024.  Topic 506403 Version 1.0  Release: 32.4.3 - C32.122  © 2024 UpToDate, Inc. and/or its affiliates. All rights reserved.  Consumer Information Use and Disclaimer   Disclaimer: This generalized information is a limited summary of diagnosis, treatment, and/or medication information. It is not meant to be comprehensive and should be used as a tool to help the user understand and/or assess potential diagnostic and treatment options. It does NOT include all information about conditions, treatments, medications, side effects, or risks that may apply to a specific patient. It is not intended to be medical advice or a substitute for the medical advice, diagnosis, or treatment of a health care provider based on the health care provider's examination and assessment of a patient's specific and unique circumstances. Patients must speak with a health care provider for complete information about their health, medical questions, and treatment options, including any risks or benefits regarding use of medications. This information does not endorse any treatments or medications as safe, effective, or approved for treating a specific patient. UpToDate, Inc. and its affiliates disclaim any warranty or liability relating to this information or the use thereof.The use of this information is governed by the Terms of Use, available at https://www.woltersMochilauwer.com/en/know/clinical-effectiveness-terms. 2024© UpToDate, Inc. and its affiliates and/or licensors. All rights reserved.  Copyright   © 2024 UpToDate, Inc. and/or its affiliates. All rights reserved.

## 2025-07-16 NOTE — PROGRESS NOTES
Adult Annual Physical  Name: Josephine Murcia      : 1965      MRN: [id number]  Encounter Provider: Frank Lombardi, DO  Encounter Date: 2025   [facility]    :  Assessment & Plan  Annual physical exam         Mixed hyperlipidemia  Pt states she has not been super compliant with the crestro - has been better the last mionth  Orders:  •  Comprehensive metabolic panel; Future  •  Lipid Panel with Direct LDL reflex; Future    Abnormal TSH    Orders:  •  TSH, 3rd generation; Future  •  T4, free; Future    Prediabetes  Decrease weight  Increase activity  Decrease carbs         Vitamin D deficiency    Orders:  •  ergocalciferol (ERGOCALCIFEROL) 1.25 MG (88541 UT) capsule; Take 1 capsule (50,000 Units total) by mouth once a week    Umbilical hernia without obstruction and without gangrene  Pt is concerned, would like to be evaluated for repair  Orders:  •  Ambulatory referral to General Surgery; Future        Preventive Screenings:    - Cervical cancer screening: screening up-to-date   - Breast cancer screening: screening up-to-date     Immunizations:  - Immunizations due: Prevnar 20, Tdap and Zoster (Shingrix)         History of Present Illness     Adult Annual Physical:  Patient presents for annual physical. Pt is sched for a full physical.     Diet and Physical Activity:  - Diet/Nutrition: well balanced diet.  - Exercise: walking, moderate cardiovascular exercise and strength training exercises.    Depression Screening:  - PHQ-2 Score: 0    General Health:  - Sleep: sleeps poorly and 4-6 hours of sleep on average.  - Hearing: normal hearing right ear and normal hearing left ear.  - Vision: vision problems.  - Dental: regular dental visits.    /GYN Health:  - Follows with GYN: yes.   - Menopause: postmenopausal.   - History of STDs: no    Advanced Care Planning:  - Has an advanced directive?: no    - Has a durable medical POA?: no      Review of Systems   Constitutional:  "Negative.  Negative for activity change, appetite change, chills, diaphoresis and fatigue.   HENT: Negative.  Negative for dental problem, ear pain, sinus pressure and sore throat.    Eyes: Negative.  Negative for photophobia, pain, discharge, redness, itching and visual disturbance.   Respiratory:  Negative for apnea and chest tightness.    Cardiovascular: Negative.  Negative for chest pain, palpitations and leg swelling.   Gastrointestinal: Negative.  Negative for abdominal distention, abdominal pain, constipation and diarrhea.   Endocrine: Negative.  Negative for cold intolerance and heat intolerance.   Genitourinary: Negative.  Negative for difficulty urinating and dyspareunia.   Musculoskeletal: Negative.  Negative for arthralgias and back pain.   Skin: Negative.    Allergic/Immunologic: Negative for environmental allergies.   Neurological: Negative.  Negative for dizziness.   Psychiatric/Behavioral: Negative.  Negative for agitation.          Objective   /68   Pulse 68   Temp (!) 96.7 °F (35.9 °C)   Resp 18   Ht 5' 5\" (1.651 m)   Wt 76.2 kg (168 lb)   BMI 27.96 kg/m²     Physical Exam  Vitals and nursing note reviewed.   Constitutional:       General: She is not in acute distress.     Appearance: She is well-developed. She is not diaphoretic.   HENT:      Head: Normocephalic and atraumatic.      Right Ear: External ear normal.      Left Ear: External ear normal.      Nose: Nose normal.      Mouth/Throat:      Pharynx: No oropharyngeal exudate.     Eyes:      General: No scleral icterus.        Right eye: No discharge.         Left eye: No discharge.      Pupils: Pupils are equal, round, and reactive to light.     Neck:      Thyroid: No thyromegaly.     Cardiovascular:      Rate and Rhythm: Normal rate.      Heart sounds: Normal heart sounds. No murmur heard.  Pulmonary:      Effort: Pulmonary effort is normal. No respiratory distress.      Breath sounds: Normal breath sounds. No wheezing. "   Abdominal:      General: Bowel sounds are normal. There is no distension.      Palpations: Abdomen is soft. There is no mass.      Tenderness: There is no abdominal tenderness. There is no guarding or rebound.     Musculoskeletal:         General: Normal range of motion.     Skin:     General: Skin is warm and dry.      Findings: No erythema or rash.     Neurological:      Mental Status: She is alert.      Coordination: Coordination normal.      Deep Tendon Reflexes: Reflexes normal.     Psychiatric:         Behavior: Behavior normal.

## 2025-08-07 DIAGNOSIS — Z12.31 ENCOUNTER FOR SCREENING MAMMOGRAM FOR HIGH-RISK PATIENT: ICD-10-CM

## (undated) DEVICE — CHLORAPREP HI-LITE 26ML ORANGE

## (undated) DEVICE — ASTOUND IMPERVIOUS SURGICAL GOWN: Brand: CONVERTORS

## (undated) DEVICE — BASIC DOUBLE BASIN 2-LF: Brand: MEDLINE INDUSTRIES, INC.

## (undated) DEVICE — GLOVE INDICATOR PI UNDERGLOVE SZ 7.5 BLUE

## (undated) DEVICE — SUT VICRYL 3-0 SH 27 IN J416H

## (undated) DEVICE — 3M™ TEGADERM™ TRANSPARENT FILM DRESSING FRAME STYLE, 1626W, 4 IN X 4-3/4 IN (10 CM X 12 CM), 50/CT 4CT/CASE: Brand: 3M™ TEGADERM™

## (undated) DEVICE — 3M™ STERI-STRIP™ REINFORCED ADHESIVE SKIN CLOSURES, R1547, 1/2 IN X 4 IN (12 MM X 100 MM), 6 STRIPS/ENVELOPE: Brand: 3M™ STERI-STRIP™

## (undated) DEVICE — TIBURON LAPAROTOMY DRAPE: Brand: CONVERTORS

## (undated) DEVICE — PACK GENERAL LF

## (undated) DEVICE — SUT MONOCRYL 4-0 PC-3 18 IN Y845G

## (undated) DEVICE — SUT VICRYL 3-0 18 IN J904T

## (undated) DEVICE — DRAPE UTILITY

## (undated) DEVICE — INTENDED FOR TISSUE SEPARATION, AND OTHER PROCEDURES THAT REQUIRE A SHARP SURGICAL BLADE TO PUNCTURE OR CUT.: Brand: BARD-PARKER ® CARBON RIB-BACK BLADES

## (undated) DEVICE — SPONGE GAUZE 4 X 9